# Patient Record
Sex: MALE | Race: ASIAN | NOT HISPANIC OR LATINO | Employment: OTHER | URBAN - METROPOLITAN AREA
[De-identification: names, ages, dates, MRNs, and addresses within clinical notes are randomized per-mention and may not be internally consistent; named-entity substitution may affect disease eponyms.]

---

## 2018-09-04 ENCOUNTER — HOSPITAL ENCOUNTER (EMERGENCY)
Facility: MEDICAL CENTER | Age: 23
End: 2018-09-06
Attending: EMERGENCY MEDICINE
Payer: MEDICAID

## 2018-09-04 DIAGNOSIS — R45.850 HOMICIDAL IDEATION: ICD-10-CM

## 2018-09-04 LAB — POC BREATHALIZER: 0 PERCENT (ref 0–0.01)

## 2018-09-04 PROCEDURE — 302970 POC BREATHALIZER: Performed by: EMERGENCY MEDICINE

## 2018-09-04 PROCEDURE — 99285 EMERGENCY DEPT VISIT HI MDM: CPT

## 2018-09-04 ASSESSMENT — LIFESTYLE VARIABLES: DO YOU DRINK ALCOHOL: NO

## 2018-09-05 LAB
AMPHET UR QL SCN: NEGATIVE
BARBITURATES UR QL SCN: NEGATIVE
BENZODIAZ UR QL SCN: NEGATIVE
BZE UR QL SCN: NEGATIVE
CANNABINOIDS UR QL SCN: NEGATIVE
METHADONE UR QL SCN: NEGATIVE
OPIATES UR QL SCN: NEGATIVE
OXYCODONE UR QL SCN: NEGATIVE
PCP UR QL SCN: NEGATIVE
PROPOXYPH UR QL SCN: NEGATIVE

## 2018-09-05 PROCEDURE — A9270 NON-COVERED ITEM OR SERVICE: HCPCS | Performed by: PSYCHIATRY & NEUROLOGY

## 2018-09-05 PROCEDURE — A9270 NON-COVERED ITEM OR SERVICE: HCPCS | Performed by: EMERGENCY MEDICINE

## 2018-09-05 PROCEDURE — 80307 DRUG TEST PRSMV CHEM ANLYZR: CPT

## 2018-09-05 PROCEDURE — 700102 HCHG RX REV CODE 250 W/ 637 OVERRIDE(OP): Performed by: PSYCHIATRY & NEUROLOGY

## 2018-09-05 PROCEDURE — 99244 OFF/OP CNSLTJ NEW/EST MOD 40: CPT | Performed by: PSYCHIATRY & NEUROLOGY

## 2018-09-05 PROCEDURE — 700102 HCHG RX REV CODE 250 W/ 637 OVERRIDE(OP): Performed by: EMERGENCY MEDICINE

## 2018-09-05 RX ORDER — MIRTAZAPINE 15 MG/1
15 TABLET, FILM COATED ORAL NIGHTLY PRN
Status: DISCONTINUED | OUTPATIENT
Start: 2018-09-05 | End: 2018-09-06 | Stop reason: HOSPADM

## 2018-09-05 RX ORDER — LORAZEPAM 1 MG/1
1 TABLET ORAL ONCE
Status: COMPLETED | OUTPATIENT
Start: 2018-09-05 | End: 2018-09-05

## 2018-09-05 RX ORDER — LURASIDONE HYDROCHLORIDE 40 MG/1
40 TABLET, FILM COATED ORAL
Status: DISCONTINUED | OUTPATIENT
Start: 2018-09-05 | End: 2018-09-06

## 2018-09-05 RX ADMIN — LURASIDONE HYDROCHLORIDE 40 MG: 40 TABLET, FILM COATED ORAL at 19:03

## 2018-09-05 RX ADMIN — LORAZEPAM 1 MG: 1 TABLET ORAL at 11:48

## 2018-09-05 ASSESSMENT — PAIN SCALES - GENERAL: PAINLEVEL_OUTOF10: 0

## 2018-09-05 NOTE — DISCHARGE PLANNING
Medical Social Work    Referral: Legal Hold    Intervention: Legal Hold Paperwork given to SW by [facility] [employer] RN Kaitlyn    Legal Hold Initiated: Date: 9/4/18 Time: 1645    Patient’s Insurance Listed on Face Sheet: None    Referrals sent to: [facility]    This referral contains the following information:  1) Face sheet _x___  2) Page 1 and Page 2 of Legal Hold _x___  3) Alert Team Assessment/Psych Assessment __done by [facility] OP_  4) Head to toe physical exam __x__  5) Urine Drug Screen __x__  6) Blood Alcohol __x__  7) Vital signs __x__  8) Pregnancy test when applicable _n/a__  9) Medications list __x__    Plan: Patient will transfer to mental health facility once acceptance is obtained  .  MSW called [facility] and alerted Rafy that pt is one of theirs and was sent over for medical clearance. Rafy to look into pt.

## 2018-09-05 NOTE — ED PROVIDER NOTES
ED Provider Note Addendum    Scribed for Tariq Burks M.D. by Patti Jha on 9/5/2018 at 10:44 AM.     This is an addendum to the note on Olvin Wtat.  For further details and full chart information, see patient's initial note.       6:00 AM - I discussed the patient's case with the night shift ERP who will transfer care of the patient to me at this time.        10:41 AM - Patient evaluated by myself. Patient is pacing around the room.  He reports feeling better than he did yesterday and is tolerating PO and fluid intake well. Denies abdominal pain or other complaints at this time. On exam, patient is agitated and disheveled. Will give 1 mg of Ativan to calm down.     Disposition:  Patient will be transferred to an appropriate psychiatric facility in stable condition for further psychiatric care and evaluation.  Patient will be placed under the care of my partner awaiting transfer.    FINAL IMPRESSION  1. Homicidal ideation         IPatti (Monseibe), am scribing for, and in the presence of, Tariq Burks M.D..    Electronically signed by: Patti Jha (Scribe), 9/5/2018    ITariq M.D. personally performed the services described in this documentation, as scribed by Patti Jha in my presence, and it is both accurate and complete.    The note accurately reflects work and decisions made by me.  Tariq Burks  9/5/2018  2:08 PM

## 2018-09-05 NOTE — PSYCHIATRY
"PSYCHIATRIC CONSULTATION:  Reason for admission:   Si/hi, med clearance from [facility]   Reason for consult: si/hi   Requesting Physician:stan    Legal status:  On hold     Chief Complaint: \"I was upset\"    HPI:    Pt is a 23 y/o man with a history of schizophrenia sent from [facility] for medical clearance; he reported he wanted to tear his sister's face off and kill him mom, also stated he would rather kill himself so he wouldn't hurt them. He has been paranoid and labile. Per ED staff, having sudden flashes of anger. He has been on psych meds in the past but hasn't taken them x 6 months.   He states he is still hearing voices currently. They are \"fragments of his mind\" telling him to watch out for people. He denies si/hi. He denies desire to hurt his family, denies command hallucinations today. He appeared internally stimulated during the interview. He seemed very guarded, quiet, difficult to talk to. He had a muffled voice.   He has been on seroquel, zyprexa, risperdal, depakote, nothing has worked for him in the past. He has had akathisia in the past with abilify. They tend to help a little at first and then stop working.   He is very flat when talking to me. Very internally stimualted. Sometimes makes no eye contact, sometimes has very intense eye contact. He does continue to appear very stimulated. He continually attends to noises that are happening outside the room.     Psychiatric Review of Systems:current symptoms as reported by pt.  Depression:      Denies depressed mood, has had suicidal ideation, low energy, no changes in appetite   [place]:No signs or symptoms   Anxiety/Panic Attacks  No signs or symptoms   PTSD symptom: unable to assess  Psychosis: +ah, very guarded, internally stimulated. States he \"sees people\", unclear if he means vh or not. Has had delusions yesterday, but isn't expressing now.   Other:       Medical Review of Systems: as reported by pt. All systems reviewed. Only those found to be + " "are noted below. All others are negative.   Neurological:    TBIs:denies    SZs:denies    Strokes:denies       Psychiatric Examination: observed phenomenon:  Vitals:   Vitals:    09/04/18 1735 09/05/18 0050 09/05/18 0608 09/05/18 1100   BP: 124/66 100/56 (!) 90/49 120/79   Pulse: 64 72 60 74   Resp: 16 16 16 12   Temp: 37 °C (98.6 °F) 36 °C (96.8 °F)  36.1 °C (96.9 °F)   SpO2: 94% 96% 94% 95%   Weight: 80.7 kg (178 lb)      Height: 1.753 m (5' 9\")          Musculoskeletal:psychomotor retardation. Poor eye contact alternating with very intense eye contact   Appearance: hair very messy.   Thoughts: +ah , paranoid   Speech: sparse , mumbling   Mood:          Depressed   Affect:         Blunted   SI/HI:   Denies today, but fear he is minimizing symtpoms   Attention/Alertness:   Poor attention   Memory:    Grossly intact.   Fund of Knowledge: unable to assess      Insight/Judgement into symptoms:  Poor   Neurological Testing:      Past Psychiatric Hx:   Has been inpatient many times per his report.   Has failed seroquel, zyprexa, abilify, risperdal.   He is willing to try another antipsychotic.     Family Psychiatric Hx:  Grandfather with schizophrenia  Other family with bipolar disorder     Social Hx:  Lives with his family including mother, brother, sister, and a friend of clint.   He states \"we all tolerate each other\"   He doesn't work, tries to explain a job he had but it doesn't quite make sense.   Social History     Social History   • Marital status: Single     Spouse name: N/A   • Number of children: N/A   • Years of education: N/A     Occupational History   • Not on file.     Social History Main Topics   • Smoking status: Not on file   • Smokeless tobacco: Not on file   • Alcohol use Not on file   • Drug use: Unknown   • Sexual activity: Not on file     Other Topics Concern   • Not on file     Social History Narrative   • No narrative on file       Drug/Alcohol/Tobacco Hx:   Drugs: meth use in the past, hasn't " "used in \"quite sometime\", use hallucinogens last month, nothing recently    Alcohol: denies    Tobacco:denies     Medical Hx: labs, MARS, medications, etc were reviewed. Only those findings of potential interest to psychiatry are noted below:  No past medical history on file.  Pt denies any medical history       Allergies: Patient has no known allergies.    Medications:  No current facility-administered medications for this encounter.      No current outpatient prescriptions on file.       Labs:  Recent Results (from the past 48 hour(s))   POC BREATHALIZER    Collection Time: 09/04/18  6:22 PM   Result Value Ref Range    POC Breathalizer 0.00 0.00 - 0.01 Percent   URINE DRUG SCREEN    Collection Time: 09/05/18 12:46 AM   Result Value Ref Range    Amphetamines Urine Negative Negative    Barbiturates Negative Negative    Benzodiazepines Negative Negative    Cocaine Metabolite Negative Negative    Methadone Negative Negative    Opiates Negative Negative    Oxycodone Negative Negative    Phencyclidine -Pcp Negative Negative    Propoxyphene Negative Negative    Cannabinoid Metab Negative Negative     No orders to display       ASSESSMENT:     Schizophrenia  R/o mood disorder     PLAN:  Legal status: extended  Starting latuda 40mg po daily   Declines antidepressant at this time.   States mood is better when psychosis is gone.      eligible for transfer to [facility] 6: NO  Will follow  Thank you for the consult.      "

## 2018-09-05 NOTE — ED NOTES
Patient's home medications have been reviewed by the pharmacy team.     No past medical history on file.    Patient's Medications    No medications on file          A:  Medications do not appear to be contributing to current complaints as pt reports no home medications.    P:    No recommendations at this time.    Caio Pineda

## 2018-09-05 NOTE — ED NOTES
Patient has already been screened at [facility], just needs medical clearance per [facility] [employer].

## 2018-09-05 NOTE — ED TRIAGE NOTES
"Patient sent from [facility] for medical clearance after reporting that he wants to tear his sister's face off and kill his mom, and would also rather kill himself \"so there would be no risk to them;\" hearing voices, paranoid, labile mood with sudden flashes of anger, hx meth abuse  "

## 2018-09-05 NOTE — ED NOTES
Belongings searched by security, money taken by PAR for safekeeping and counted with security. Remaining belongings placed in locker #13.

## 2018-09-05 NOTE — ED NOTES
Patient given turkey sandwich and water. Asked patient to inform RN when he is able to provide a urine specimen, patient agrees

## 2018-09-05 NOTE — ED NOTES
Pt awake to verbal, he has no complaints this am , pt continues to rest he is calm and cooperative with plan of care , security is outside room for 1:1 observation

## 2018-09-05 NOTE — ED PROVIDER NOTES
"ED Provider Note    Scribed for Dr. Muriel Alejo M.D. by Mikel Velasco. 9/4/2018, 6:41 PM.    CHIEF COMPLAINT  Chief Complaint   Patient presents with   • Homicidal Ideation   • Suicidal Ideation       HPI  Olvin Watt is a 22 y.o. male who presents to the Emergency Department for evaluation of suicidal and homicidal ideation onset six months ago. Olvin admits to self harm in the past. He states he has been non-compliant with his medications for the last six months, because he moved and did not establish care with a new doctor. Olvin denies any drug use at this time.    Per nurses note the patient states he wanted to harm his sister and mother and would rather kill himself. He is reported to be paranoid, hearing noises, and have a labile mood with anger flashes.    REVIEW OF SYSTEMS  Pertinent positives include Suicidal ideation, homicidal ideation. Pertinent negatives include no drug use. E.    PAST MEDICAL HISTORY  History of bipolar and schizophrenia disorders    SOCIAL HISTORY  Social History   Substance Use Topics   • Smoking status: None noted   • Smokeless tobacco: None noted   • Alcohol use None noted      History   Drug use: Unknown       SURGICAL HISTORY  patient denies any surgical history    FAMILY HISTORY  None noted    CURRENT MEDICATIONS  No current facility-administered medications on file prior to encounter.      No current outpatient prescriptions on file prior to encounter.       ALLERGIES  No Known Allergies    PHYSICAL EXAM  VITAL SIGNS: /66   Pulse 64   Temp 37 °C (98.6 °F)   Resp 16   Ht 1.753 m (5' 9\")   Wt 80.7 kg (178 lb)   SpO2 94%   BMI 26.29 kg/m²     Constitutional: Alert in no apparent distress.  HENT: Normocephalic, Atraumatic, Bilateral external ears normal. Nose normal.   Eyes:  Conjunctiva normal, non-icteric.   Lungs: Non-labored respirations  Skin: Warm, Dry, No erythema, No rash.   Neurologic: Alert, Grossly non-focal.   Psychiatric: Withdraw, Flat " affect      LABS  Labs Reviewed   POC BREATHALIZER - Normal   URINE DRUG SCREEN       COURSE & MEDICAL DECISION MAKING  Pertinent Labs & Imaging studies reviewed. (See chart for details)    6:41 PM - Patient seen and examined at bedside. Ordered Urine Drug Screen to evaluate his symptoms. Informed him [facility] will be in with him shortly for further evaluation.  Patient has already been seen by another [place] [facility] mental [facility] and placed on a hold.  He is medically clear will be transferred when a bed is available.    DISPOSITION:   Patient was medically cleared and will be transferred to a psychiatric facility when a bed is available      FINAL IMPRESSION  1. Homicidal ideation         This dictation has been created using voice recognition software and/or scribes. The accuracy of the dictation is limited by the abilities of the software and the expertise of the scribes. I expect there may be some errors of grammar and possibly content. I made every attempt to manually correct the errors within my dictation. However, errors related to voice recognition software and/or scribes may still exist and should be interpreted within the appropriate context.    The note accurately reflects work and decisions made by me.  Muriel Alejo  9/5/2018  1:47 AM

## 2018-09-05 NOTE — ED NOTES
Pt resting in bed, officer is at the bedside for 1:1 observation,  Pt reoriented to place and situation, pt requesting

## 2018-09-06 VITALS
SYSTOLIC BLOOD PRESSURE: 118 MMHG | HEART RATE: 77 BPM | BODY MASS INDEX: 26.36 KG/M2 | WEIGHT: 178 LBS | OXYGEN SATURATION: 98 % | RESPIRATION RATE: 16 BRPM | DIASTOLIC BLOOD PRESSURE: 74 MMHG | HEIGHT: 69 IN | TEMPERATURE: 98.4 F

## 2018-09-06 PROCEDURE — 99283 EMERGENCY DEPT VISIT LOW MDM: CPT | Performed by: PSYCHIATRY & NEUROLOGY

## 2018-09-06 RX ORDER — LURASIDONE HYDROCHLORIDE 80 MG/1
80 TABLET, FILM COATED ORAL
Status: DISCONTINUED | OUTPATIENT
Start: 2018-09-06 | End: 2018-09-06 | Stop reason: HOSPADM

## 2018-09-06 NOTE — PSYCHIATRY
"PSYCHIATRIC FOLLOW UP:    Reason for Admission: SI, HI, medical clearance from [facility], psychosis  Psychiatric Supervising Attending: Araceli Christian MD        Subjective: No acute events overnight. Security continues to watch pt. The pt has been following staff instructions and adhering to the medication schedule. The pt was reserved and a poor historian during the interview. He denies difficulties with taking Latuda. Endorses feeling \"anxious\". Continues to be quite paranoid, standing at his room window staring out into the ruby. Asked why there was not \"a symbol of time\" in his room. Also asked questions about dish soap and washing his body with it. Denies adverse reactions to Latuda. Discussed increasing dose. Denied additional questions or concerns.       Psychiatric Examination (MSE) :    Vitals: /74   Pulse 77   Temp 36.9 °C (98.4 °F)   Resp 16   Ht 1.753 m (5' 9\")   Wt 80.7 kg (178 lb)   SpO2 98%   BMI 26.29 kg/m²   Musculoskeletal:psychomotor retardation. Poor eye contact alternating with very intense eye contact   Appearance: hair very messy.   Thoughts: +AH., +paranoia  Speech: sparse , mumbling   Mood:          Depressed   Affect:         Blunted   SI/HI: denies SI, +HI - saying \"everyone has those thoughts.\"   Attention/Alertness:   Poor attention   Memory:    Grossly intact.   Fund of Knowledge: unable to assess      Insight/Judgement into symptoms:  Poor     Assessment:  1. Schizophrenia  2. R/o specific mood disorder    Plan:   INCREASE Latuda to 80 mg po daily  Recommend transfer to [facility] - being transferred today  Legal status: extended  /Signing off - pt transferring to [facility]  Thank you for the consult.       "

## 2018-09-06 NOTE — ED NOTES
Report received from Mason CLINE.  Pt resting on cot. Eyes closed.  Pt breathing regular and not labored.  Security outside room.

## 2018-09-06 NOTE — ED NOTES
"Discharging patient with [facility]MA to [facility]. All questions answered. Vitals signs WNL. Pt given discharge information. Pt did want to wait to get items out of safe keeping. \"I want to leave now\". Pt advised to return to ER upon discharge from [facility].  Discharge assessment completed.   "

## 2018-09-06 NOTE — ED NOTES
Pt requesting something to eat . Pt given boxed lunch and water.  Pt has no other needs at this time.  Security outside room.

## 2018-09-06 NOTE — DISCHARGE PLANNING
.Medical Social Work    Referral: Legal hold Transfer to Mental Health Facility    Intervention: JASON received call from Letitia at [facility] stating that they have accepted the patient for admission.     Pt's accepting physcian is Dr. Saul GOMEZ arranged for transportation to be set up through [facility]    The pt will be picked up at 1100.     JASON notified the RN of the departure time as well as accepting facility.     JASON created transfer packet and placed on chart.     Plan: Pt will transfer back to [facility] at 1100.

## 2018-09-06 NOTE — DISCHARGE PLANNING
Alert team note:  Awake and alert.  Denies S/i or H/I.  States the voices in his head have lessened and they are not saying harmful things.  States it is helpful to get off the streets, get hydrated, and get some sleep, as it helps the voices.  Continue to wait for psychiatric placement.

## 2019-01-04 ENCOUNTER — HOSPITAL ENCOUNTER (EMERGENCY)
Facility: MEDICAL CENTER | Age: 24
End: 2019-01-07
Attending: EMERGENCY MEDICINE
Payer: MEDICARE

## 2019-01-04 DIAGNOSIS — R45.850 HOMICIDAL IDEATION: ICD-10-CM

## 2019-01-04 DIAGNOSIS — Z00.8 MEDICAL CLEARANCE FOR PSYCHIATRIC ADMISSION: ICD-10-CM

## 2019-01-04 LAB
AMPHET UR QL SCN: NEGATIVE
BARBITURATES UR QL SCN: NEGATIVE
BENZODIAZ UR QL SCN: NEGATIVE
BZE UR QL SCN: NEGATIVE
CANNABINOIDS UR QL SCN: NEGATIVE
METHADONE UR QL SCN: NEGATIVE
OPIATES UR QL SCN: NEGATIVE
OXYCODONE UR QL SCN: NEGATIVE
PCP UR QL SCN: NEGATIVE
POC BREATHALIZER: 0 PERCENT (ref 0–0.01)
PROPOXYPH UR QL SCN: NORMAL

## 2019-01-04 PROCEDURE — 302970 POC BREATHALIZER: Performed by: EMERGENCY MEDICINE

## 2019-01-04 PROCEDURE — A9270 NON-COVERED ITEM OR SERVICE: HCPCS | Performed by: EMERGENCY MEDICINE

## 2019-01-04 PROCEDURE — 99285 EMERGENCY DEPT VISIT HI MDM: CPT

## 2019-01-04 PROCEDURE — 80307 DRUG TEST PRSMV CHEM ANLYZR: CPT

## 2019-01-04 PROCEDURE — 700102 HCHG RX REV CODE 250 W/ 637 OVERRIDE(OP): Performed by: EMERGENCY MEDICINE

## 2019-01-04 RX ORDER — SERTRALINE HYDROCHLORIDE 100 MG/1
100 TABLET, FILM COATED ORAL DAILY
Status: DISCONTINUED | OUTPATIENT
Start: 2019-01-05 | End: 2019-01-07 | Stop reason: HOSPADM

## 2019-01-04 RX ORDER — SERTRALINE HYDROCHLORIDE 100 MG/1
200 TABLET, FILM COATED ORAL EVERY MORNING
COMMUNITY

## 2019-01-04 RX ORDER — TRAZODONE HYDROCHLORIDE 50 MG/1
300 TABLET ORAL NIGHTLY
Status: DISCONTINUED | OUTPATIENT
Start: 2019-01-04 | End: 2019-01-07 | Stop reason: HOSPADM

## 2019-01-04 RX ORDER — TRAZODONE HYDROCHLORIDE 300 MG/1
300 TABLET ORAL NIGHTLY
COMMUNITY
End: 2019-01-07

## 2019-01-04 RX ORDER — LURASIDONE HYDROCHLORIDE 80 MG/1
80 TABLET, FILM COATED ORAL
Status: DISCONTINUED | OUTPATIENT
Start: 2019-01-04 | End: 2019-01-07 | Stop reason: HOSPADM

## 2019-01-04 RX ORDER — LURASIDONE HYDROCHLORIDE 80 MG/1
80 TABLET, FILM COATED ORAL
COMMUNITY
End: 2019-10-11

## 2019-01-04 RX ADMIN — LURASIDONE HYDROCHLORIDE 80 MG: 80 TABLET, FILM COATED ORAL at 19:09

## 2019-01-04 RX ADMIN — TRAZODONE HYDROCHLORIDE 300 MG: 50 TABLET ORAL at 21:36

## 2019-01-04 ASSESSMENT — PAIN SCALES - GENERAL
PAINLEVEL_OUTOF10: 0
PAINLEVEL_OUTOF10: 0

## 2019-01-04 ASSESSMENT — LIFESTYLE VARIABLES: DO YOU DRINK ALCOHOL: NO

## 2019-01-04 NOTE — ED NOTES
Pt belongings placed in locker 13 after being checked by security. Security obtained cigarettes, lighters and other smoking items which they will keep until discharge.

## 2019-01-04 NOTE — DISCHARGE PLANNING
MSW received legal hold form Alert team Carey. Pt is from [facility] outpatient clinic. Inpatient should be expecting pt once medically cleared. MSW spoke to Frandy at [facility] intake and they are aware of pt. Will send medical clearance over once completed.

## 2019-01-04 NOTE — ED TRIAGE NOTES
"Pt brought in by ambulance from [facility] for medical clearance. Pt was brought there this morning and placed on a legal hold for homicidal ideation. Pt having thoughts of harming his family. Pt states to this RN, \"its not a plan, its not a thought, it's something Im going to do\". Pt has history of HI toward family. EMS reports pt has been non-compliant with medications. Pt cooperative on arrival.   "

## 2019-01-04 NOTE — ED NOTES
Security immediately called to bedside on arrival due to homicidal ideation. Security at bedside.

## 2019-01-04 NOTE — ED NOTES
Pt continues to rest with no discomfort or distress. Security monitoring with 1:1 supervision. Continue to monitor.

## 2019-01-04 NOTE — ED NOTES
This RN contacted social work regarding plan. Awaiting acceptance from [facility] for transfer back.

## 2019-01-04 NOTE — ED NOTES
Med Rec completed per paper in chart from facility he came from   Allergies reviewed  No ORAL antibiotics in last 30 days

## 2019-01-04 NOTE — ED PROVIDER NOTES
"ED Provider Note    Scribed for Tariq Burks M.D. by Jonah Matthew. 1/4/2019  10:00 AM    Primary care provider: No primary care provider on file.  Means of arrival: EMS  History obtained from: patient, EMS  History limited by: none    CHIEF COMPLAINT  Chief Complaint   Patient presents with   • Medical Clearance   • Homicidal Ideation       CHEO Watt is a 23 y.o. male who presents to the Emergency Department for medical clearane and evaluation of homicidal ideation starting this morning. Patient presents from [facility] for medical clearance after arriving at the facility for evaluation of homicidal ideation. He reports that he is having ideation of harming his family, but does not have a plan to complete this act. Patient states that he has not been compliant with is prescribed medications because they \":haven't been working.\" Patient denies suicidal ideation, fever, chest pain, shortness of breat, abdominal pain, nausea, vomiting, diarrhea, alcohol use.     REVIEW OF SYSTEMS  Pertinent positives include homicidal ideation, medication non compliance. Pertinent negatives include no suicidal ideation, fever, chest pain, shortness of breat, abdominal pain, nausea, vomiting, diarrhea.  All other systems reviewed and negative.    PAST MEDICAL HISTORY   has a past medical history of Psychiatric disorder.    SURGICAL HISTORY  patient denies any surgical history    SOCIAL HISTORY  Social History   Substance Use Topics   • Smoking status: No   • Smokeless tobacco: No   • Alcohol use No      History   Drug use: Yes       FAMILY HISTORY  None noted    CURRENT MEDICATIONS  Home Medications     Reviewed by Akila Gustafson (Pharmacy Tech) on 01/04/19 at 1331  Med List Status: Complete   Medication Last Dose Status   lurasidone (LATUDA) 80 MG Tab 1/3/2019 Active   sertraline (ZOLOFT) 100 MG Tab 1/4/2019 Active   trazodone (DESYREL) 300 MG tablet 1/3/2019 " "Active                ALLERGIES  No Known Allergies    PHYSICAL EXAM  VITAL SIGNS: /85   Pulse 84   Temp 36.9 °C (98.4 °F) (Tympanic)   Resp 20   Ht 1.753 m (5' 9\")   Wt 83.9 kg (185 lb)   BMI 27.32 kg/m²     Constitutional: Well developed, Well nourished, no distress, Non-toxic appearance.   HENT: Normocephalic, Atraumatic, Bilateral external ears normal, Oropharynx moist, No oral exudates.   Eyes: PERRLA, EOMI, Conjunctiva normal, No discharge.   Neck: No tenderness, Supple, No stridor.   Lymphatic: No lymphadenopathy noted.   Cardiovascular: Normal heart rate, Normal rhythm.   Thorax & Lungs: Clear to auscultation bilaterally, No respiratory distress, No wheezing, No crackles.   Abdomen: Soft, No tenderness, No masses, No pulsatile masses.   Skin: Warm, Dry, No erythema, No rash.   Extremities:, No edema No cyanosis.   Musculoskeletal: No tenderness to palpation or major deformities noted.  Intact distal pulses  Neurologic: Awake, alert. Moves all extremities spontaneously.  Psychiatric: Very withdrawn, poor eye contact, seems agitated.     LABS  Labs Reviewed   POC BREATHALIZER - Normal   URINE DRUG SCREEN   All labs reviewed by me.    RADIOLOGY  No orders to display   The radiologist's interpretation of all radiological studies have been reviewed by me.    COURSE & MEDICAL DECISION MAKING  Pertinent Labs & Imaging studies reviewed. (See chart for details)    I reviewed the patient's medical records which showed the patient as last seen in the ED for SI, HI.     10:00 AM - Patient seen and examined at bedside. Ordered POC breathalyzer, Urine drug screen to evaluate his symptoms. The differential diagnoses include but are not limited to: homicidal ideation, medication non compliance    11:40 AM - I discussed the patient's case and the above findings with Alert team who agrees to evaluate the patient.,  They have seen the assessment and hold have been placed by outpatient psychiatrist        Decision " Making:  Patient sent here by [facility] for medical clearance.  The patient is having homicidal ideations, has not been taking his medicines.  The patient has no medical complaints, have medically cleared the patient, the patient will be transferred to a psychiatric facility.    Disposition:  Patient will be transferred to an appropriate psychiatric facility in stable condition for further psychiatric care and evaluation.  Patient will be placed under the care of my partner awaiting transfer.    FINAL IMPRESSION  1. Homicidal ideation    2. Medical clearance for psychiatric admission          IJonah ([place]), am scribing for, and in the presence of, Tariq Burks M.D..    Electronically signed by: Jonah Matthew ([place]), 1/4/2019    ITariq M.D. personally performed the services described in this documentation, as scribed by Jonah Matthew in my presence, and it is both accurate and complete.    The note accurately reflects work and decisions made by me.  Tariq Burks  1/4/2019  2:07 PM

## 2019-01-05 PROCEDURE — 700102 HCHG RX REV CODE 250 W/ 637 OVERRIDE(OP): Performed by: EMERGENCY MEDICINE

## 2019-01-05 PROCEDURE — A9270 NON-COVERED ITEM OR SERVICE: HCPCS | Performed by: EMERGENCY MEDICINE

## 2019-01-05 RX ADMIN — LURASIDONE HYDROCHLORIDE 80 MG: 80 TABLET, FILM COATED ORAL at 20:08

## 2019-01-05 RX ADMIN — TRAZODONE HYDROCHLORIDE 300 MG: 50 TABLET ORAL at 20:37

## 2019-01-05 RX ADMIN — SERTRALINE 100 MG: 100 TABLET, FILM COATED ORAL at 06:00

## 2019-01-05 ASSESSMENT — PAIN SCALES - GENERAL: PAINLEVEL_OUTOF10: 0

## 2019-01-05 NOTE — ED NOTES
Break RN: patient resting w/ visible, unlabored respirations observed; security in direct view of pt.

## 2019-01-05 NOTE — ED NOTES
Patient sleeping on left side, RR even and unlabored. Remains in view of security. All safety maintained, will CTM.

## 2019-01-05 NOTE — ED NOTES
Pt lying in bed with even, unlabored respirations noted.  No needs at this time. Will continue to monitor.

## 2019-01-05 NOTE — DISCHARGE PLANNING
MSW called and spoke with Sydni at [facility]. They are full at this time. Likely will not have a bed this weekend.

## 2019-01-05 NOTE — ED NOTES
Patient's home medications have been reviewed by the pharmacy team.     Past Medical History:   Diagnosis Date   • Psychiatric disorder        Patient's Medications   New Prescriptions    No medications on file   Previous Medications    LURASIDONE (LATUDA) 80 MG TAB    Take 80 mg by mouth with dinner.    SERTRALINE (ZOLOFT) 100 MG TAB    Take 100 mg by mouth every day.    TRAZODONE (DESYREL) 300 MG TABLET    Take 300 mg by mouth every evening.   Modified Medications    No medications on file   Discontinued Medications    No medications on file       C/o HI     A:  Medications do not appear to be contributing to current complaints.       P:    No recommendations at this time. Home medications have been reordered as appropriate.      Kami Clay, PharmD., BCPS

## 2019-01-05 NOTE — DISCHARGE PLANNING
Alert Team  Spoke with JASON Rashid this am to confirm pt does NOT need AT assessment.  Per JASON, pt to be direct admit to [facility] as soon as first bed available.  Pt was assessed at their outpt clinic just prior to arrival and was sent purely for medical clearance.  He is one of their existing patients.  Pt waiting on bed at [facility].  AT remains available for any pt needs while in the ER.

## 2019-01-05 NOTE — ED NOTES
Pt lying in bed with even, unlabored respirations noted. Pt remains in 1:1 direct observation of security. No needs at this time. Will continue to monitor.

## 2019-01-05 NOTE — ED NOTES
Pt lying in bed with even, unlabored respirations noted. Pt remains in 1:1 direct observation with security. No needs at this time. Will continue to monitor.

## 2019-01-05 NOTE — ED NOTES
Pt sleeping, easily wakes for vitals. Medicated per MAR. Remains calm and cooperative. Denies needs at this time. Remains in view of security. All safety maintained.

## 2019-01-05 NOTE — ED NOTES
Checked in with patient, asking if he was hungry.  Patient given juice and a box meal.  Asked to go home and I told him no, when he asked why I advised him that he was a legal hold. He indicated understanding

## 2019-01-05 NOTE — ED NOTES
Patient remains sleeping, repositions self periodically. Remains in view of . All safety maintained, will CTM.

## 2019-01-05 NOTE — ED NOTES
Pt lying in bed with even, unlabored respirations noted. Pt remains in 1:1 direct observation by security. No needs at this time. Will continue to monitor.

## 2019-01-05 NOTE — ED PROVIDER NOTES
"ED Provider Note  Addendum: Patient was signed out to me by Dr. Christina.  The patient has no episodes throughout the evening.  Vital signs are stable, has no evidence of an organ damage.  He is waiting for transfer to a local psychiatric facility.  /65   Pulse 75   Temp 36.7 °C (98.1 °F) (Temporal)   Resp 16   Ht 1.753 m (5' 9\")   Wt 83.9 kg (185 lb)   SpO2 95%   BMI 27.32 kg/m²         "

## 2019-01-05 NOTE — ED NOTES
Patient sleeping, easily wakes for vitals. Medicated per MAR. Patient remains calm and cooperative. Remains in view of security. No needs at this time, will CTM.

## 2019-01-05 NOTE — ED NOTES
Pt awakes easily to verbal stimulation. Pt given breakfast tray. No further needs at this time. Security in direct observation of patient with unobstructed view.

## 2019-01-05 NOTE — ED NOTES
Patient remains sleeping, repositions self periodically. RR even and unlabored. Remains in view of security. All safety maintained, will ctm.

## 2019-01-06 PROCEDURE — 700102 HCHG RX REV CODE 250 W/ 637 OVERRIDE(OP): Performed by: EMERGENCY MEDICINE

## 2019-01-06 PROCEDURE — A9270 NON-COVERED ITEM OR SERVICE: HCPCS | Performed by: EMERGENCY MEDICINE

## 2019-01-06 RX ADMIN — SERTRALINE 100 MG: 100 TABLET, FILM COATED ORAL at 06:00

## 2019-01-06 RX ADMIN — TRAZODONE HYDROCHLORIDE 300 MG: 50 TABLET ORAL at 21:26

## 2019-01-06 RX ADMIN — LURASIDONE HYDROCHLORIDE 80 MG: 80 TABLET, FILM COATED ORAL at 18:28

## 2019-01-06 NOTE — ED NOTES
Pt given morning medicine, pt calm and cooperative, security at bedside, no complaints at this time

## 2019-01-06 NOTE — ED PROVIDER NOTES
Patient reevaluated.  Patient is on a legal hold, waiting transfer to psychiatric facility when a bed is available.  Please refer to initial note for complete details.  No concerns overnight.  Patient ambulates to the bathroom independently and tolerated a meal.  Medication reconciliation has been reviewed.

## 2019-01-06 NOTE — ED NOTES
Pt resting in bed.  Verbalizes understanding of plan of care.  Calm and cooperative.  Security in direct obs of pt.

## 2019-01-06 NOTE — ED NOTES
Pt provided with lunch. Calm and cooperative. Sitting in strecther eating. Sitter continues to monitor.

## 2019-01-06 NOTE — ED NOTES
Verbal report taken from Leslie RN; pt resting w/ respirations visible and unlabored; security in view of pt.

## 2019-01-06 NOTE — DISCHARGE PLANNING
Alert team  note;  [facility] evaluation completed today at 1700  by Li with recommendation to transfer pt to [facility] for inpt MH treatment

## 2019-01-07 VITALS
BODY MASS INDEX: 27.4 KG/M2 | SYSTOLIC BLOOD PRESSURE: 119 MMHG | TEMPERATURE: 97.7 F | HEART RATE: 83 BPM | DIASTOLIC BLOOD PRESSURE: 79 MMHG | OXYGEN SATURATION: 95 % | WEIGHT: 185 LBS | HEIGHT: 69 IN | RESPIRATION RATE: 16 BRPM

## 2019-01-07 PROCEDURE — A9270 NON-COVERED ITEM OR SERVICE: HCPCS | Performed by: EMERGENCY MEDICINE

## 2019-01-07 PROCEDURE — 700102 HCHG RX REV CODE 250 W/ 637 OVERRIDE(OP): Performed by: EMERGENCY MEDICINE

## 2019-01-07 PROCEDURE — 99284 EMERGENCY DEPT VISIT MOD MDM: CPT | Performed by: PSYCHIATRY & NEUROLOGY

## 2019-01-07 RX ORDER — MIRTAZAPINE 15 MG/1
7.5 TABLET, FILM COATED ORAL
Qty: 30 TAB | Refills: 0 | Status: SHIPPED | OUTPATIENT
Start: 2019-01-07 | End: 2019-10-11

## 2019-01-07 RX ADMIN — SERTRALINE 100 MG: 100 TABLET, FILM COATED ORAL at 06:33

## 2019-01-07 NOTE — ED NOTES
Legal hold discontinued by MD Christian.  Pt belongings returned to pt.  Pt provided with discharge instructions, prescriptions x1, instructions for follow up appointment, s/s of when to seek emergency care.  Pt verbalizes understanding.  Pt discharged in good condition.

## 2019-01-07 NOTE — ED NOTES
Hourly rounding performed. Assessed patient complaints and Bathroom/comfort needs.  Patient sleeping. Sitter at bedside

## 2019-01-09 ASSESSMENT — ENCOUNTER SYMPTOMS
EYES NEGATIVE: 1
CARDIOVASCULAR NEGATIVE: 1
GASTROINTESTINAL NEGATIVE: 1
MUSCULOSKELETAL NEGATIVE: 1
CONSTITUTIONAL NEGATIVE: 1
PSYCHIATRIC NEGATIVE: 1
RESPIRATORY NEGATIVE: 1
NEUROLOGICAL NEGATIVE: 1
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
## 2019-09-29 ENCOUNTER — HOSPITAL ENCOUNTER (EMERGENCY)
Facility: MEDICAL CENTER | Age: 24
End: 2019-09-29
Attending: EMERGENCY MEDICINE
Payer: MEDICARE

## 2019-09-29 VITALS
TEMPERATURE: 98.5 F | HEIGHT: 68 IN | DIASTOLIC BLOOD PRESSURE: 97 MMHG | BODY MASS INDEX: 30.1 KG/M2 | RESPIRATION RATE: 24 BRPM | OXYGEN SATURATION: 97 % | SYSTOLIC BLOOD PRESSURE: 130 MMHG | HEART RATE: 83 BPM | WEIGHT: 198.63 LBS

## 2019-09-29 DIAGNOSIS — R51.9 FACIAL PAIN: ICD-10-CM

## 2019-09-29 DIAGNOSIS — K04.7 PERIAPICAL ABSCESS: ICD-10-CM

## 2019-09-29 PROCEDURE — 700102 HCHG RX REV CODE 250 W/ 637 OVERRIDE(OP): Performed by: EMERGENCY MEDICINE

## 2019-09-29 PROCEDURE — A9270 NON-COVERED ITEM OR SERVICE: HCPCS | Performed by: EMERGENCY MEDICINE

## 2019-09-29 PROCEDURE — 99284 EMERGENCY DEPT VISIT MOD MDM: CPT

## 2019-09-29 RX ORDER — AMOXICILLIN 500 MG/1
500 TABLET, FILM COATED ORAL 3 TIMES DAILY
Qty: 21 TAB | Refills: 0 | Status: SHIPPED | OUTPATIENT
Start: 2019-09-29 | End: 2019-10-11

## 2019-09-29 RX ORDER — HYDROCODONE BITARTRATE AND ACETAMINOPHEN 5; 325 MG/1; MG/1
2 TABLET ORAL ONCE
Status: COMPLETED | OUTPATIENT
Start: 2019-09-29 | End: 2019-09-29

## 2019-09-29 RX ORDER — AMOXICILLIN 250 MG/1
500 CAPSULE ORAL ONCE
Status: COMPLETED | OUTPATIENT
Start: 2019-09-29 | End: 2019-09-29

## 2019-09-29 RX ORDER — IBUPROFEN 600 MG/1
600 TABLET ORAL ONCE
Status: COMPLETED | OUTPATIENT
Start: 2019-09-29 | End: 2019-09-29

## 2019-09-29 RX ORDER — HYDROCODONE BITARTRATE AND ACETAMINOPHEN 5; 325 MG/1; MG/1
1-2 TABLET ORAL EVERY 4 HOURS PRN
Qty: 20 TAB | Refills: 0 | Status: SHIPPED | OUTPATIENT
Start: 2019-09-29 | End: 2019-10-02

## 2019-09-29 RX ADMIN — HYDROCODONE BITARTRATE AND ACETAMINOPHEN 2 TABLET: 5; 325 TABLET ORAL at 18:23

## 2019-09-29 RX ADMIN — AMOXICILLIN 500 MG: 250 CAPSULE ORAL at 18:23

## 2019-09-29 RX ADMIN — IBUPROFEN 600 MG: 600 TABLET ORAL at 18:23

## 2019-09-30 NOTE — ED NOTES
Pt for d/c per request. Dental referral list provided as well erp provided referrals as well for pt f/u . Prescriptions also reviewed with pt and mom. Aware of no driving due to meds recvd in er as well as while taking narcotics

## 2019-09-30 NOTE — ED TRIAGE NOTES
Pt in w/ mother  C/o severe R side face pain that has been ongoing since yesterday  No Hx of such  Does have dental abscesses in the past however nothing this bad before per mother  Pt uncooperative and very difficulty to get information from him   writhing in pain  Rocking back and forth in chair crying and holding R side of face  Taken back to RM 12

## 2019-09-30 NOTE — ED NOTES
Continues with ice bag to rt side of face, states improvement in pain with same. Med per erp. Mom remains at bedside, denies further needs

## 2019-09-30 NOTE — ED PROVIDER NOTES
"ED Provider Note    CHIEF COMPLAINT  Chief Complaint   Patient presents with   • Pain     severe R side of facial pain that stated yesterday       HPI  Olvin Watt is a 23 y.o. male who presents with severe right-sided facial pain without swelling onset yesterday.  Pain is constant.  He can swallow.  No fever or headache.  He has had dental pain before but this is more severe.  Denies trauma.  No allergies..    REVIEW OF SYSTEMS  Pertinent positives include: Right facial pain.  Pertinent negatives include: Fever, shortness of breath, inability to swallow.    PAST MEDICAL HISTORY  Past Medical History:   Diagnosis Date   • Psychiatric disorder        CURRENT MEDICATIONS  Home Medications     Reviewed by Kadie Wei R.N. (Registered Nurse) on 09/29/19 at 1745  Med List Status: Partial   Medication Last Dose Status   lurasidone (LATUDA) 80 MG Tab 9/29/2019 Active   mirtazapine (REMERON) 15 MG Tab  Active   sertraline (ZOLOFT) 100 MG Tab  Active                ALLERGIES  No Known Allergies    PHYSICAL EXAM  VITAL SIGNS: BP (!) 214/82   Pulse 92   Temp 36.4 °C (97.5 °F) (Temporal)   Resp (!) 28   Ht 1.727 m (5' 8\")   Wt 90.1 kg (198 lb 10.2 oz)   SpO2 99%   BMI 30.20 kg/m²   Constitutional: Well developed, Well nourished, hypertensive, holding an ice pack to the right side of his face.  Occasionally flailing trembling and yelling with pain nurse first by periods of calmness  HENT: Atraumatic mild asymmetry on right suspicious for swelling but the mother denies that its present, no TMJ crepitus, no trismus, no intraoral erythema or edema.  He has her neuropathy right rear lower molar and all his right sided lower molars are tender.  There is no abscess pointing to the gingiva.  No severe caries..   Eyes: PERRLA,  no scleral icterus.   Neck: , Supple, no meningismus or nuchal rigidity.   Lymphatic: No lymphadenopathy noted.   Respiratory: No rales, rhonchi or wheeze or stridor.   Skin: No erythema, no rash. "     COURSE & MEDICAL DECISION MAKING    INTERVENTIONS:  Medications   ibuprofen (MOTRIN) tablet 600 mg (has no administration in time range)   HYDROcodone-acetaminophen (NORCO) 5-325 MG per tablet 2 Tab (has no administration in time range)   amoxicillin (AMOXIL) capsule 500 mg (has no administration in time range)      Improved pain to 6 of 10 but he still had a burning sensation to the pain.    This patient presents with facial pain that is likely dental.  He has no respiratory symptoms of sinusitis and pharyngitis are unlikely.  There is no ear pain.  There is no evidence of TMJ crepitus.  There is been no trauma.  There is no significant facial swelling.  There is no Marquise's angina or evidence of peritonsillar abscess.    PLAN:  Amoxicillin  Hydrocodone 5 mg  Prescription monitoring queried and score low  Opiate risk tool utilized and patient low risk  Informed consent obtained for opiate analgesic  Indication opiate analgesic dental pain  Dental pain handout  Follow up 1 to 4 days in the clinic of Dr. Lawrence or the clinic of dentiscarmencita Tolbert  Return for severe uncontrolled pain, shortness of breath, inability to swallow, significant facial swelling or ill appearance    Condition: Good    FINAL IMPRESSION  1. Facial pain    2. Periapical abscess          Electronically signed by: Jermaine Shahid, 9/29/2019 6:16 PM

## 2019-09-30 NOTE — ED NOTES
Pt appears to have discomfort to erp palpation at rt lower jaw. Per same, no definite area of inflammation

## 2019-09-30 NOTE — ED NOTES
Pt tearful, punched chair due to pain, minimally verbal at this time, info from mom. Warm blanket and ice bag provided. encouraged to relax, reman calm as staff is here to help

## 2019-09-30 NOTE — DISCHARGE INSTRUCTIONS
Dental Pain (Tooth Ache)    Start antibiotics today and take ibuprofen 600-800 mg every 6 hours or naproxen 500 mg every 8-12 hours for pain.    [facility] appearance, shortness of breath, difficulty swallowing or significant facial swelling.  Followup with your dentist, the on call oral surgeon Dr. Lawrence, the [facility] dentist, Dr. Jeff Tolbert ([phone], [address] [facility]), or Dr. Guillaume Hicks ([phone]) in 4 days.    You had an elevated or high normal blood pressure reading today.  This does not necessarily mean you have hypertension.  Please followup with your/a primary physician for comprehensive blood pressure evaluation.  BP Readings from Last 3 Encounters:   09/29/19 144/99   01/07/19 119/79   09/06/18 118/74       You have been seen by your caregiver because of a tooth ache. This may be caused by cavities (tooth decay) which expose the nerve of the tooth to air and hot or cold temperatures, which cause pain. It may come from an infection or abscess (also called a boil or furuncle) around your tooth, also often caused by dental caries (tooth decay). This causes the pain you are having.    DIAGNOSIS (HOW DO YOU TELL WHAT IS WRONG)  Your caregiver can diagnose this problem often by exam.    TREATMENT  If caused by an infection it may be treated with antibiotics (medications which kill germs) and pain medications as prescribed by your caregiver. Take medications as directed.  You may take ibuprofen (Advil® or Motrin®), acetaminophen (Tylenol®), or both, as needed for pain or temperature. Ibuprofen and acetaminophen may be taken together in their regular dosages.  Whether the tooth ache today is caused by infection or dental disease, it is advisable to see your dentist as soon as possible for further care.     CALL OR RETURN TO THIS LOCATION IF:  The exam and treatment you received today has been provided on an emergency basis only. This is not a substitute for complete medical or dental care.  If your problem worsens or new symptoms (problems) appear, and you are unable to arrange prompt follow-up care with your dentist, call or return to this location.      [employer]® Patient Information ©2007 [employer].

## 2019-10-10 ENCOUNTER — HOSPITAL ENCOUNTER (EMERGENCY)
Facility: MEDICAL CENTER | Age: 24
End: 2019-10-11
Attending: EMERGENCY MEDICINE
Payer: MEDICARE

## 2019-10-10 DIAGNOSIS — T50.902A INTENTIONAL DRUG OVERDOSE, INITIAL ENCOUNTER (HCC): ICD-10-CM

## 2019-10-10 DIAGNOSIS — R45.851 SUICIDAL IDEATION: ICD-10-CM

## 2019-10-10 LAB
ANION GAP SERPL CALC-SCNC: 11 MMOL/L (ref 0–11.9)
APAP SERPL-MCNC: <10 UG/ML (ref 10–30)
BASOPHILS # BLD AUTO: 0.7 % (ref 0–1.8)
BASOPHILS # BLD: 0.08 K/UL (ref 0–0.12)
BUN SERPL-MCNC: 14 MG/DL (ref 8–22)
CALCIUM SERPL-MCNC: 9.8 MG/DL (ref 8.5–10.5)
CHLORIDE SERPL-SCNC: 106 MMOL/L (ref 96–112)
CO2 SERPL-SCNC: 23 MMOL/L (ref 20–33)
CREAT SERPL-MCNC: 1.44 MG/DL (ref 0.5–1.4)
EOSINOPHIL # BLD AUTO: 0.14 K/UL (ref 0–0.51)
EOSINOPHIL NFR BLD: 1.3 % (ref 0–6.9)
ERYTHROCYTE [DISTWIDTH] IN BLOOD BY AUTOMATED COUNT: 43.6 FL (ref 35.9–50)
ETHANOL BLD-MCNC: 0 G/DL
GLUCOSE SERPL-MCNC: 128 MG/DL (ref 65–99)
HCT VFR BLD AUTO: 46.2 % (ref 42–52)
HGB BLD-MCNC: 15.3 G/DL (ref 14–18)
IMM GRANULOCYTES # BLD AUTO: 0.03 K/UL (ref 0–0.11)
IMM GRANULOCYTES NFR BLD AUTO: 0.3 % (ref 0–0.9)
LYMPHOCYTES # BLD AUTO: 2.48 K/UL (ref 1–4.8)
LYMPHOCYTES NFR BLD: 23.2 % (ref 22–41)
MCH RBC QN AUTO: 31 PG (ref 27–33)
MCHC RBC AUTO-ENTMCNC: 33.1 G/DL (ref 33.7–35.3)
MCV RBC AUTO: 93.5 FL (ref 81.4–97.8)
MONOCYTES # BLD AUTO: 0.8 K/UL (ref 0–0.85)
MONOCYTES NFR BLD AUTO: 7.5 % (ref 0–13.4)
NEUTROPHILS # BLD AUTO: 7.16 K/UL (ref 1.82–7.42)
NEUTROPHILS NFR BLD: 67 % (ref 44–72)
NRBC # BLD AUTO: 0 K/UL
NRBC BLD-RTO: 0 /100 WBC
PLATELET # BLD AUTO: 234 K/UL (ref 164–446)
PMV BLD AUTO: 10.7 FL (ref 9–12.9)
POTASSIUM SERPL-SCNC: 3.3 MMOL/L (ref 3.6–5.5)
RBC # BLD AUTO: 4.94 M/UL (ref 4.7–6.1)
SALICYLATES SERPL-MCNC: 0 MG/DL (ref 15–25)
SODIUM SERPL-SCNC: 140 MMOL/L (ref 135–145)
WBC # BLD AUTO: 10.7 K/UL (ref 4.8–10.8)

## 2019-10-10 PROCEDURE — 80048 BASIC METABOLIC PNL TOTAL CA: CPT

## 2019-10-10 PROCEDURE — 85025 COMPLETE CBC W/AUTO DIFF WBC: CPT

## 2019-10-10 PROCEDURE — 99285 EMERGENCY DEPT VISIT HI MDM: CPT

## 2019-10-10 PROCEDURE — 93005 ELECTROCARDIOGRAM TRACING: CPT | Performed by: EMERGENCY MEDICINE

## 2019-10-10 PROCEDURE — 304561 HCHG STAT O2

## 2019-10-10 PROCEDURE — 80307 DRUG TEST PRSMV CHEM ANLYZR: CPT

## 2019-10-11 VITALS
OXYGEN SATURATION: 95 % | BODY MASS INDEX: 26.66 KG/M2 | RESPIRATION RATE: 16 BRPM | TEMPERATURE: 97.2 F | HEART RATE: 85 BPM | SYSTOLIC BLOOD PRESSURE: 118 MMHG | DIASTOLIC BLOOD PRESSURE: 63 MMHG | WEIGHT: 180 LBS | HEIGHT: 69 IN

## 2019-10-11 LAB
AMPHET UR QL SCN: NEGATIVE
BARBITURATES UR QL SCN: NEGATIVE
BENZODIAZ UR QL SCN: POSITIVE
BZE UR QL SCN: NEGATIVE
CANNABINOIDS UR QL SCN: POSITIVE
EKG IMPRESSION: NORMAL
METHADONE UR QL SCN: NEGATIVE
OPIATES UR QL SCN: POSITIVE
OXYCODONE UR QL SCN: NEGATIVE
PCP UR QL SCN: NEGATIVE
PROPOXYPH UR QL SCN: NEGATIVE

## 2019-10-11 PROCEDURE — 700105 HCHG RX REV CODE 258: Performed by: EMERGENCY MEDICINE

## 2019-10-11 PROCEDURE — 80307 DRUG TEST PRSMV CHEM ANLYZR: CPT

## 2019-10-11 PROCEDURE — A9270 NON-COVERED ITEM OR SERVICE: HCPCS | Performed by: EMERGENCY MEDICINE

## 2019-10-11 PROCEDURE — 700102 HCHG RX REV CODE 250 W/ 637 OVERRIDE(OP): Performed by: EMERGENCY MEDICINE

## 2019-10-11 PROCEDURE — 90791 PSYCH DIAGNOSTIC EVALUATION: CPT

## 2019-10-11 RX ORDER — SODIUM CHLORIDE 9 MG/ML
1000 INJECTION, SOLUTION INTRAVENOUS ONCE
Status: COMPLETED | OUTPATIENT
Start: 2019-10-11 | End: 2019-10-11

## 2019-10-11 RX ORDER — ZOLPIDEM TARTRATE 10 MG/1
10 TABLET ORAL NIGHTLY PRN
COMMUNITY

## 2019-10-11 RX ORDER — AMOXICILLIN 500 MG/1
500 CAPSULE ORAL EVERY 8 HOURS
Status: DISCONTINUED | OUTPATIENT
Start: 2019-10-11 | End: 2019-10-11 | Stop reason: HOSPADM

## 2019-10-11 RX ORDER — AMOXICILLIN 500 MG/1
500 CAPSULE ORAL 3 TIMES DAILY
Qty: 21 CAP | Refills: 0 | Status: SHIPPED | OUTPATIENT
Start: 2019-10-11 | End: 2019-10-18

## 2019-10-11 RX ADMIN — SODIUM CHLORIDE 1000 ML: 9 INJECTION, SOLUTION INTRAVENOUS at 01:15

## 2019-10-11 RX ADMIN — AMOXICILLIN 500 MG: 500 CAPSULE ORAL at 12:48

## 2019-10-11 NOTE — ED NOTES
Pt belongings sent home with mother, Jen. Pt still sleeping at this time. Respirations equal and unlabored. Sitter 1:1.

## 2019-10-11 NOTE — ED NOTES
Pt requesting food, ERP allowed food after pt tolerating liquids. Water with straw provided to pt with RN assist. Pt not tolerating fluids, pt coughing and spitting up. Will attempt again later when more awake. Pt still very disoriented and mumbling unintelligibly

## 2019-10-11 NOTE — ED PROVIDER NOTES
ED Provider Note    Patient signed out from Dr. Aleman pending life skills evaluation.  Please see his note for the initial evaluation and management.  Lab results were fairly unremarkable and patient continued to be very somnolent in the ED.  Life skills attempted evaluation but due to patient's continued somnolence, they were unable to do so.  Patient did express to EMS that he took the Ambien in a suicidal gesture and therefore was placed on legal hold.  Patient will be signed out to the oncoming ED physician for continued monitoring and reevaluation by behavioral health once patient is more alert.

## 2019-10-11 NOTE — ED NOTES
Pt sleeping. Sitter 1:1 respirations equal and unlabored. Mother leaving at this time. Provided with ER phone number to call if she wants an update.

## 2019-10-11 NOTE — ED NOTES
Pt waking and mumbling unintelligibly removing monitors requiring frequent redirection and education with minimal evidence of learning

## 2019-10-11 NOTE — CONSULTS
"[facility]   TRIAGE ASSESSMENT    Name: Olvin Watt  MRN: [id number]  : 1995  Age: 23 y.o.  Date of assessment: 10/11/2019  PCP: Pcp Pt States None  Persons in attendance: Patient    CHIEF COMPLAINT/PRESENTING ISSUE (as stated by ): Has exhibited several conflicting statements: I didn't take 30 ambien I only took two with two pain pills.\"  Then he stated that he was mad that he wasted all those ambien and did not die. Another time he denies S/I. Drowsy, rambling slurred speech. States he just needs some pain pills for his pain.    Chief Complaint   Patient presents with   • Drug Overdose   • Suicide Attempt        CURRENT LIVING SITUATION/SOCIAL SUPPORT: states he lives with his grandmother.     BEHAVIORAL HEALTH TREATMENT HISTORY  Does patient/parent report a history of prior behavioral health treatment for patient?   Yes:    Dates Level of Care Facilty/Provider Diagnosis/Problem Medications   [id number] IP/OP [facility] Schizophrenia Latuda zoloft  Remeron                SAFETY ASSESSMENT - SELF  Does patient acknowledge current or past symptoms of dangerousness to self? Yes OD'd on Ambien  Does parent/significant other report patient has current or past symptoms of dangerousness to self? N\A  Does presenting problem suggest symptoms of dangerousness to self? Yes:     Past Current    Suicidal Thoughts: [x]  [x]    Suicidal Plans: [x]  [x]    Suicidal Intent: [x]  [x]    Suicide Attempts: [x]  [x]    Self-Injury [x]  [x]      For any boxes checked above, provide detail: states he would still like to die and is mad that he is still alive and would like to cut himself.     History of suicide by family member: no  History of suicide by friend/significant other: no  Recent change in frequency/specificity/intensity of suicidal thoughts or self-harm behavior? no  Current access to firearms, medications, or other identified means of suicide/self-harm? yes - knives  If yes, willing to restrict " "access to means of suicide/self-harm? yes - in treatment  Protective factors present:  Strong family connections and Willing to address in treatment    SAFETY ASSESSMENT - OTHERS  Does patient acknowledge current or past symptoms of aggressive behavior or risk to others? no  Does parent/significant other report patient has current or past symptoms of aggressive behavior or risk to others?  no  Does presenting problem suggest symptoms of dangerousness to others? No    Crisis Safety Plan completed and copy given to patient? pending    ABUSE/NEGLECT SCREENING  Does patient report feeling “unsafe” in his/her home, or afraid of anyone?  no  Does patient report any history of physical, sexual, or emotional abuse?  no  Does parent or significant other report any of the above? no  Is there evidence of neglect by self?  no  Is there evidence of neglect by a caregiver? no  Does the patient/parent report any history of CPS/APS/police involvement related to suspected abuse/neglect or domestic violence? no  Based on the information provided during the current assessment, is a mandated report of suspected abuse/neglect being made?  No    SUBSTANCE USE SCREENING  Yes:  Jermaine all substances used in the past 30 days: denies      Last Use Amount   []   Alcohol     []   Marijuana     []   Heroin     []   Prescription Opioids  (used without prescription, for    recreation, or in excess of prescribed amount)     []   Other Prescription  (used without prescription, for    recreation, or in excess of prescribed amount)     []   Cocaine      []   Methamphetamine     []   \"\" drugs (ectasy, MDMA)     []   Other substances        UDS results: pending  Breathalyzer results: 0.00    What consequences does the patient associate with any of the above substance use and or addictive behaviors? None    Risk factors for detox (check all that apply):  []  Seizures   []  Diaphoretic (sweating)   []  Tremors   []  Hallucinations   []  Increased " blood pressure   []  Decreased blood pressure   []  Other   []  None      [] Patient education on risk factors for detoxification and instructed to return to ER as needed.      MENTAL STATUS   Participation: Limited verbal participation, Defensive and Resistant  Grooming: Disheveled  Orientation: Drowsy/Somnolent  Behavior: Unusual behaviors noted and uncooperative.  Eye contact: Poor  Mood: Depressed and Angry  Affect: Flat and Angry  Thought process: unable to determine due to being uncooperative  Thought content: Paranoia  Speech: Soft, Latency of response and rambling slurring words.  Perception: unable to determine due to being uncooperative  Memory:  Recent:  Limited  Insight: Limited  Judgment:  Limited  Other:    Collateral information:   Source:  [] Significant other present in person:   [] Significant other by telephone  [] [facility]   [] [facility] Nursing Staff  [x] [facility]  [] Other:     [] Unable to complete full assessment due to:  [] Acute intoxication  [x] Patient declined to participate/engage  [] Patient verbally unresponsive  [] Significant cognitive deficits  [] Significant perceptual distortions or behavioral disorganization  [] Other:      CLINICAL IMPRESSIONS:  Primary:  Suicide attempt.   Secondary:  Schizophrenia       IDENTIFIED NEEDS/PLAN:  [Trigger DISPOSITION list for any items marked]    []  Imminent safety risk - self [] Imminent safety risk - others   []  Acute substance withdrawal []  Psychosis/Impaired reality testing   []  Mood/anxiety []  Substance use/Addictive behavior   []  Maladaptive behaviro []  Parent/child conflict   []  Family/Couples conflict []  Biomedical   []  Housing []  Financial   []   Legal  Occupational/Educational   []  Domestic violence []  Other:     Disposition: Refer to Legal [facility], [facility], [facility] and [facility]    Does patient express agreement with the above plan? no    Referral appointment(s)  scheduled? no    Alert team only:   I have discussed findings and recommendations with Dr. Otero who is in agreement with these recommendations.     Referral information sent to the following community providers :[facility], [facility] and [facility]  If applicable : Referred  to : Gay for legal hold follow up at (time): 7788      Faby Montenegro R.N.  10/11/2019

## 2019-10-11 NOTE — ED NOTES
Mother to bedside. Pt still asleep at this time. Mother updated on POC. Emergency contact info verified.

## 2019-10-11 NOTE — ED NOTES
Pt remains asleep. Somnolent but arousable. Respirations equal and unlabored. Pt unable to urinate at this time. Sitter 1:1. VSS.

## 2019-10-11 NOTE — ED PROVIDER NOTES
"ED Provider Note    11:26 AM    Subjective:  Patient is awaiting transfer to psychiatric facility.  He overdosed on Ambien.  He complains of right facial pain and tooth discomfort.  He states that this started today and he is having a hard time opening and closing his mouth.  He has not had a fever.  He is not sure if it swollen.  He denies trauma or fall.  He does report that he was on antibiotics and they seem to help before, but now swelling is returning.    Objective: BP (!) 90/60   Pulse 95   Temp 36.5 °C (97.7 °F) (Temporal)   Resp 20   Ht 1.753 m (5' 9\")   Wt 81.6 kg (180 lb)   SpO2 97%   BMI 26.58 kg/m² .  Calm and cooperative.  He is still somewhat sedated.  Generally nontoxic.  Mild right maxillary space swelling.  Intraoral examination demonstrates several dental caries with tenderness of the right maxillary gingiva with mild erythema of the gingiva overlying the right maxillary premolar and molars.  The tongue is normal.  There is no drainable abscess.    Laboratory data was reviewed.  Negative Tylenol and acetaminophen.    Assesment/Plan:   1.  Suicide attempt by overdose-plan for lifeskills evaluation and psychiatric care.  2.  Dental caries with associated infection-initiate amoxicillin 3 times daily no evidence of drainable abscess at this time.      Electronically signed by: Mason Dela Cruz, 10/11/2019 11:26 AM    "
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
## 2019-10-11 NOTE — ED NOTES
Patient's home medications have been reviewed by the pharmacy team.     Past Medical History:   Diagnosis Date   • Psychiatric disorder        Patient's Medications   New Prescriptions    AMOXICILLIN (AMOXIL) 500 MG CAP    Take 1 Cap by mouth 3 times a day for 7 days.   Previous Medications    SERTRALINE (ZOLOFT) 100 MG TAB    Take 200 mg by mouth every morning.    ZOLPIDEM (AMBIEN) 10 MG TAB    Take 10 mg by mouth at bedtime as needed for Sleep.   Modified Medications    No medications on file   Discontinued Medications    AMOXICILLIN 500 MG TAB    Take 1 Tab by mouth 3 times a day.    LURASIDONE (LATUDA) 80 MG TAB    Take 80 mg by mouth with dinner.    MIRTAZAPINE (REMERON) 15 MG TAB    Take 0.5 Tabs by mouth every bedtime.          A: Medications do not appear to be contributing to current complaints.     P: No recommendations at this time. Amoxicillin was continued per ERP for dental infection. Home medications have been discussed with ERP and sertraline and zolpidem was not been reordered at this time. Defer to psychiatry.     Serena Borden, Pharm.D., BCPS

## 2019-10-11 NOTE — ED NOTES
Pt waking up and removing monitors, continually requiring redirection. PSA at bedside. Pt still disoriented

## 2019-10-11 NOTE — ED NOTES
Assumed care of pt.  Pt attempted to get out of bed.  Pt with unsteady gait and slurred speech.  Pt assisted back into bed.

## 2019-10-11 NOTE — ED NOTES
"Pt's mother called originally started out concerned and inquiring about pt's status. When Legal hold protocol was discussed and explained and that we would have top wait for patient to become clear and coherent before the behavioral health/ life skills team could assess him she became very agitated. Pt's mother \"Jen\" [phone] began reporting her experience with EMS and how she had threatened to christianne them for not stepping into traffic to collect pt. This RN reiterated psych hold protocol. Pt's mother left number and reported she would F/U later.   "

## 2019-10-11 NOTE — ED NOTES
Med Rec completed per patient's home pharmacy ([facility])   Allergies reviewed  No ORAL antibiotics in last 14 days

## 2019-10-11 NOTE — ED PROVIDER NOTES
"ER Provider Note     Scribed for Hemal Aleman M.D. by Kenrick Lamas. 10/10/2019, 9:22 PM.    Primary Care Provider: Pcp Pt States None  Means of Arrival: EMS   History obtained from: Patient  History limited by: Acute Intoxication    CHIEF COMPLAINT  Chief Complaint   Patient presents with   • Drug Overdose   • Suicide Attempt       HPI  Olvin Watt is a 23 y.o. male who presents to the Emergency Department post status suspected drug overdose with ALOC. Per EMS, patient took approximately 30 Ambien in an attempt to take his life. [place] his friends, who called EMS after the patient became combative. Upon arrival to the ED, patient is very somnolent and endorsed SI to EMS.      HPI limited secondary to patient's acute intoxication.    REVIEW OF SYSTEMS  See HPI for further details.     ROS limited secondary to patient's acute intoxication.    PAST MEDICAL HISTORY   has a past medical history of Psychiatric disorder.    SURGICAL HISTORY  patient denies any surgical history    SOCIAL HISTORY  Social History     Tobacco Use   • Smoking status: Unknown If Ever Smoked   Substance Use Topics   • Alcohol use: Not on file   • Drug use: Yes      Social History     Substance and Sexual Activity   Drug Use Yes       FAMILY HISTORY  History reviewed. No pertinent family history.    CURRENT MEDICATIONS  Current Outpatient Medications:   •  Amoxicillin 500 MG Tab, Take 1 Tab by mouth 3 times a day., Disp: 21 Tab, Rfl: 0  •  mirtazapine (REMERON) 15 MG Tab, Take 0.5 Tabs by mouth every bedtime., Disp: 30 Tab, Rfl: 0  •  lurasidone (LATUDA) 80 MG Tab, Take 80 mg by mouth with dinner., Disp: , Rfl:   •  sertraline (ZOLOFT) 100 MG Tab, Take 100 mg by mouth every day., Disp: , Rfl:     ALLERGIES  No Known Allergies    PHYSICAL EXAM  VITAL SIGNS: BP (!) 91/57   Pulse 89   Temp 36.5 °C (97.7 °F) (Temporal)   Resp 20   Ht 1.753 m (5' 9\")   Wt 81.6 kg (180 lb)   SpO2 98%   " BMI 26.58 kg/m²       Constitutional: Somnolent, arousalkable to painful stimuli  HENT: No signs of trauma, Bilateral external ears normal, Nose normal.   Eyes: Pupils are equal and reactive 2-1, Conjunctiva normal, Non-icteric.   Neck: Normal range of motion, No tenderness, Supple, No stridor.   Lymphatic: No lymphadenopathy noted.   Cardiovascular: Regular rate and rhythm, no murmurs.   Thorax & Lungs: Normal breath sounds, No respiratory distress, No wheezing, No chest tenderness.   Abdomen: Bowel sounds normal, Soft, No tenderness, No masses, No pulsatile masses. No peritoneal signs.  Skin: Warm, Dry, No erythema, No rash.   Back: No bony tenderness, No CVA tenderness.   Extremities: Intact distal pulses, No edema, No tenderness, No cyanosis.  Musculoskeletal: Good range of motion in all major joints. No tenderness to palpation or major deformities noted.   Neurologic: Alert , Normal motor function, Normal sensory function, No focal deficits noted. No clonus or rigidity.  Psychiatric: Slurred speech    DIAGNOSTIC STUDIES / PROCEDURES    EKG Interpretation:  Interpreted by me  12 Lead EKG interpreted by me to show:  Cinus tachycardic  Rate 112  Axis: Normal  Intervals: Normal  Normal T waves  Normal ST segments  My impression of this EKG: Does not indicate ischemia or arrhythmia at this time.     LABS  Labs Reviewed   CBC WITH DIFFERENTIAL - Abnormal; Notable for the following components:       Result Value    MCHC 33.1 (*)     All other components within normal limits   BASIC METABOLIC PANEL - Abnormal; Notable for the following components:    Potassium 3.3 (*)     Glucose 128 (*)     Creatinine 1.44 (*)     All other components within normal limits   SALICYLATE - Abnormal; Notable for the following components:    Salicylates, Quant. 0 (*)     All other components within normal limits   DIAGNOSTIC ALCOHOL   ACETAMINOPHEN   ESTIMATED GFR   URINE DRUG SCREEN     All labs reviewed by me.    COURSE & MEDICAL  DECISION MAKING  Pertinent Labs & Imaging studies reviewed. (See chart for details)    This is a 23 y.o. male that presents with overdose on benzodiazepine versus other sleep aid.  This time the patient is controlling his airway.  He is arousable.  I will perform a toxicology work-up and look for salicylate versus Tylenol and get an alcohol level.  I will reassess after this..     9:22 PM - Patient seen and examined at bedside. Ordered acetaminophen, salicylate, CBC with diff, BMP, urine drug screen, diagnostic alcohol, and EKG.      10:50 PM - Patient's mother at bedside.     HYDRATION: Based on the patient's presentation of elevated creatinine the patient was given IV fluids. IV Hydration was used because oral hydration was not adequate alone. Upon recheck following hydration, the patient was I,proved.    On reassessment the patient has a negative salicylate as well as Tylenol level he has no electrolyte derangements.  He has a negative alcohol level.  I will have him evaluated by ED behavioral health when he awakes.  There is some significant about him being suicidal but it is difficult to assess at this time.    I send this patient out to Dr. Duarte follow-up with the behavioral health team.    FINAL IMPRESSION  1. Intentional drug overdose, initial encounter (HCC)          IKenrick (Scribe), am scribing for, and in the presence of, Hemal Aleman M.D..    Electronically signed by: Kenrick Lamas ([facility]), 10/10/2019    Hemal SEGOVIA M.D. personally performed the services described in this documentation, as scribed by Kenrick Lamas in my presence, and it is both accurate and complete.     C.    The note accurately reflects work and decisions made by me.  Hemal Aleman  10/11/2019  2:08 AM

## 2019-10-12 NOTE — ED NOTES
Pt transferred to [facility] with [facility].  Pt had no belongings, per report pt's Mom had taken them home.

## 2020-09-18 NOTE — DISCHARGE PLANNING
Attempted assessment. Uncooperative. Rambling and slurring words. Difficult to understand. Denies S/I or H/I, when asked why he took an overdose of Ambien, he reported the he only took 2 ambian and 2 pain pills. States he is just acting like he is loaded. When asked what the date is he responded that he needs some benzo's.  Will wait another hour to attempt assessment.  
Difficult to awaken, drowsy, states he wants to go home. Encouraged to eat and talk to this writer. Complains of tooth pain. Denies S/I.  Will complete assessment when he is done eating.  
JASON made aware that [facility] accepted patient.  Accepting MD [place].  JASON completed [facility] PCS form and obtained transport time of 16:30.  JASON updated [facility].   
Referral: Legal Hold     Intervention: Legal Hold Paperwork given to SW by Alert Team.     Legal Hold Initiated: Date: 10- Time: 0610     Patient’s Insurance Listed on Face Sheet: Medicare     [facility], [facility], and [facility].      This referral contains the following information:  1. Face sheet _x___  2. Page 1 and Page 2 of Legal Hold _x___  3. Alert Team Assessment/Psych Assessment _x__  4. Head to toe physical exam ___x_  5. Urine Drug Screen _x___  6. Blood Alcohol _x___  7. Vital signs __x__  8. Pregnancy test when applicable _NA__  9. Medications list __x__     Plan: Patient will transfer to mental health facility once acceptance is obtained  
(0) indicator not present
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
## 2022-06-07 NOTE — ED NOTES
Pt ambulated to the bathroom with security at bedside, pt voided 300ml dark genaro urine   Stable edema.  No intravitreal injection today.  I recommend patient returning in 2-3 wks for [facility] OS.

## 2023-02-16 NOTE — ED NOTES
Pt resting on cot. Eyes closed.  Pt breathing regular and not labored.  Security outside room.    +R periorbital ecchymosis and pain

## 2024-04-09 ENCOUNTER — APPOINTMENT (OUTPATIENT)
Dept: RADIOLOGY | Facility: MEDICAL CENTER | Age: 29
DRG: 084 | End: 2024-04-09
Attending: EMERGENCY MEDICINE
Payer: OTHER MISCELLANEOUS

## 2024-04-09 ENCOUNTER — HOSPITAL ENCOUNTER (INPATIENT)
Facility: MEDICAL CENTER | Age: 29
LOS: 2 days | DRG: 084 | End: 2024-04-11
Attending: EMERGENCY MEDICINE | Admitting: SURGERY
Payer: OTHER MISCELLANEOUS

## 2024-04-09 ENCOUNTER — APPOINTMENT (OUTPATIENT)
Dept: RADIOLOGY | Facility: MEDICAL CENTER | Age: 29
DRG: 084 | End: 2024-04-09
Payer: OTHER MISCELLANEOUS

## 2024-04-09 DIAGNOSIS — S06.5XAA SUBDURAL HEMATOMA (HCC): ICD-10-CM

## 2024-04-09 PROBLEM — T14.90XA TRAUMA: Status: ACTIVE | Noted: 2024-04-09

## 2024-04-09 PROBLEM — F99 PSYCHIATRIC DISORDER: Status: ACTIVE | Noted: 2024-04-09

## 2024-04-09 PROBLEM — Z53.09 CONTRAINDICATION TO DEEP VEIN THROMBOSIS (DVT) PROPHYLAXIS: Status: ACTIVE | Noted: 2024-04-09

## 2024-04-09 LAB
ABO GROUP BLD: NORMAL
ALBUMIN SERPL BCP-MCNC: 4.4 G/DL (ref 3.2–4.9)
ALBUMIN/GLOB SERPL: 1.8 G/DL
ALP SERPL-CCNC: 59 U/L (ref 30–99)
ALT SERPL-CCNC: 29 U/L (ref 2–50)
ANION GAP SERPL CALC-SCNC: 14 MMOL/L (ref 7–16)
APTT PPP: 22.7 SEC (ref 24.7–36)
AST SERPL-CCNC: 25 U/L (ref 12–45)
BILIRUB SERPL-MCNC: 0.5 MG/DL (ref 0.1–1.5)
BLD GP AB SCN SERPL QL: NORMAL
BUN SERPL-MCNC: 15 MG/DL (ref 8–22)
CALCIUM ALBUM COR SERPL-MCNC: 8.6 MG/DL (ref 8.5–10.5)
CALCIUM SERPL-MCNC: 8.9 MG/DL (ref 8.5–10.5)
CFT BLD TEG: 3.4 MIN (ref 4.6–9.1)
CFT P HPASE BLD TEG: 3.4 MIN (ref 4.3–8.3)
CHLORIDE SERPL-SCNC: 105 MMOL/L (ref 96–112)
CLOT ANGLE BLD TEG: 71 DEGREES (ref 63–78)
CLOT LYSIS 30M P MA LENFR BLD TEG: 0.3 % (ref 0–2.6)
CO2 SERPL-SCNC: 19 MMOL/L (ref 20–33)
CORTIS SERPL-MCNC: 34.2 UG/DL (ref 0–23)
CREAT SERPL-MCNC: 1.41 MG/DL (ref 0.5–1.4)
CT.EXTRINSIC BLD ROTEM: 1.5 MIN (ref 0.8–2.1)
ERYTHROCYTE [DISTWIDTH] IN BLOOD BY AUTOMATED COUNT: 41.4 FL (ref 35.9–50)
ETHANOL BLD-MCNC: <10.1 MG/DL
GFR SERPLBLD CREATININE-BSD FMLA CKD-EPI: 70 ML/MIN/1.73 M 2
GLOBULIN SER CALC-MCNC: 2.4 G/DL (ref 1.9–3.5)
GLUCOSE SERPL-MCNC: 131 MG/DL (ref 65–99)
HCT VFR BLD AUTO: 44.9 % (ref 42–52)
HGB BLD-MCNC: 15.8 G/DL (ref 14–18)
INR PPP: 1 (ref 0.87–1.13)
MCF BLD TEG: 54.5 MM (ref 52–69)
MCF.PLATELET INHIB BLD ROTEM: 15.5 MM (ref 15–32)
MCH RBC QN AUTO: 32 PG (ref 27–33)
MCHC RBC AUTO-ENTMCNC: 35.2 G/DL (ref 32.3–36.5)
MCV RBC AUTO: 91.1 FL (ref 81.4–97.8)
PA AA BLD-ACNC: 4.3 % (ref 0–11)
PA ADP BLD-ACNC: 74.5 % (ref 0–17)
PLATELET # BLD AUTO: 184 K/UL (ref 164–446)
PMV BLD AUTO: 10.8 FL (ref 9–12.9)
POTASSIUM SERPL-SCNC: 4 MMOL/L (ref 3.6–5.5)
PROT SERPL-MCNC: 6.8 G/DL (ref 6–8.2)
PROTHROMBIN TIME: 13.3 SEC (ref 12–14.6)
RBC # BLD AUTO: 4.93 M/UL (ref 4.7–6.1)
RH BLD: NORMAL
SODIUM SERPL-SCNC: 138 MMOL/L (ref 135–145)
TEG ALGORITHM TGALG: ABNORMAL
WBC # BLD AUTO: 17.3 K/UL (ref 4.8–10.8)

## 2024-04-09 PROCEDURE — 85027 COMPLETE CBC AUTOMATED: CPT

## 2024-04-09 PROCEDURE — 85576 BLOOD PLATELET AGGREGATION: CPT

## 2024-04-09 PROCEDURE — 700105 HCHG RX REV CODE 258: Performed by: NURSE PRACTITIONER

## 2024-04-09 PROCEDURE — A9270 NON-COVERED ITEM OR SERVICE: HCPCS | Performed by: NURSE PRACTITIONER

## 2024-04-09 PROCEDURE — 85730 THROMBOPLASTIN TIME PARTIAL: CPT

## 2024-04-09 PROCEDURE — 770022 HCHG ROOM/CARE - ICU (200)

## 2024-04-09 PROCEDURE — 72131 CT LUMBAR SPINE W/O DYE: CPT

## 2024-04-09 PROCEDURE — 85610 PROTHROMBIN TIME: CPT

## 2024-04-09 PROCEDURE — 700102 HCHG RX REV CODE 250 W/ 637 OVERRIDE(OP): Performed by: NURSE PRACTITIONER

## 2024-04-09 PROCEDURE — 700105 HCHG RX REV CODE 258: Performed by: SURGERY

## 2024-04-09 PROCEDURE — 80307 DRUG TEST PRSMV CHEM ANLYZR: CPT

## 2024-04-09 PROCEDURE — 85384 FIBRINOGEN ACTIVITY: CPT

## 2024-04-09 PROCEDURE — 81003 URINALYSIS AUTO W/O SCOPE: CPT

## 2024-04-09 PROCEDURE — 71260 CT THORAX DX C+: CPT

## 2024-04-09 PROCEDURE — 80053 COMPREHEN METABOLIC PANEL: CPT

## 2024-04-09 PROCEDURE — 86900 BLOOD TYPING SEROLOGIC ABO: CPT

## 2024-04-09 PROCEDURE — G0390 TRAUMA RESPONS W/HOSP CRITI: HCPCS

## 2024-04-09 PROCEDURE — 700111 HCHG RX REV CODE 636 W/ 250 OVERRIDE (IP): Mod: JZ | Performed by: NURSE PRACTITIONER

## 2024-04-09 PROCEDURE — 72170 X-RAY EXAM OF PELVIS: CPT

## 2024-04-09 PROCEDURE — 700117 HCHG RX CONTRAST REV CODE 255: Mod: UD | Performed by: EMERGENCY MEDICINE

## 2024-04-09 PROCEDURE — 86901 BLOOD TYPING SEROLOGIC RH(D): CPT

## 2024-04-09 PROCEDURE — 72128 CT CHEST SPINE W/O DYE: CPT

## 2024-04-09 PROCEDURE — 99222 1ST HOSP IP/OBS MODERATE 55: CPT | Performed by: SURGERY

## 2024-04-09 PROCEDURE — 99291 CRITICAL CARE FIRST HOUR: CPT

## 2024-04-09 PROCEDURE — 82533 TOTAL CORTISOL: CPT

## 2024-04-09 PROCEDURE — 70450 CT HEAD/BRAIN W/O DYE: CPT

## 2024-04-09 PROCEDURE — 82077 ASSAY SPEC XCP UR&BREATH IA: CPT

## 2024-04-09 PROCEDURE — 36415 COLL VENOUS BLD VENIPUNCTURE: CPT

## 2024-04-09 PROCEDURE — 85347 COAGULATION TIME ACTIVATED: CPT

## 2024-04-09 PROCEDURE — 71045 X-RAY EXAM CHEST 1 VIEW: CPT

## 2024-04-09 PROCEDURE — 86850 RBC ANTIBODY SCREEN: CPT

## 2024-04-09 RX ORDER — HYDROMORPHONE HYDROCHLORIDE 1 MG/ML
0.5 INJECTION, SOLUTION INTRAMUSCULAR; INTRAVENOUS; SUBCUTANEOUS
Status: DISCONTINUED | OUTPATIENT
Start: 2024-04-09 | End: 2024-04-11 | Stop reason: HOSPADM

## 2024-04-09 RX ORDER — SODIUM CHLORIDE 9 MG/ML
1000 INJECTION, SOLUTION INTRAVENOUS ONCE
Status: COMPLETED | OUTPATIENT
Start: 2024-04-09 | End: 2024-04-09

## 2024-04-09 RX ORDER — ENEMA 19; 7 G/133ML; G/133ML
1 ENEMA RECTAL
Status: DISCONTINUED | OUTPATIENT
Start: 2024-04-09 | End: 2024-04-11 | Stop reason: HOSPADM

## 2024-04-09 RX ORDER — ACETAMINOPHEN 325 MG/1
650 TABLET ORAL EVERY 4 HOURS PRN
Status: DISCONTINUED | OUTPATIENT
Start: 2024-04-09 | End: 2024-04-11 | Stop reason: HOSPADM

## 2024-04-09 RX ORDER — FAMOTIDINE 20 MG/1
20 TABLET, FILM COATED ORAL 2 TIMES DAILY
Status: DISCONTINUED | OUTPATIENT
Start: 2024-04-09 | End: 2024-04-11 | Stop reason: HOSPADM

## 2024-04-09 RX ORDER — OXYCODONE HYDROCHLORIDE 5 MG/1
5 TABLET ORAL
Status: DISCONTINUED | OUTPATIENT
Start: 2024-04-09 | End: 2024-04-11 | Stop reason: HOSPADM

## 2024-04-09 RX ORDER — ACETAMINOPHEN 650 MG/1
650 SUPPOSITORY RECTAL EVERY 4 HOURS PRN
Status: DISCONTINUED | OUTPATIENT
Start: 2024-04-09 | End: 2024-04-11 | Stop reason: HOSPADM

## 2024-04-09 RX ORDER — DOCUSATE SODIUM 100 MG/1
100 CAPSULE, LIQUID FILLED ORAL 2 TIMES DAILY
Status: DISCONTINUED | OUTPATIENT
Start: 2024-04-09 | End: 2024-04-11 | Stop reason: HOSPADM

## 2024-04-09 RX ORDER — BISACODYL 10 MG
10 SUPPOSITORY, RECTAL RECTAL
Status: DISCONTINUED | OUTPATIENT
Start: 2024-04-09 | End: 2024-04-11 | Stop reason: HOSPADM

## 2024-04-09 RX ORDER — ONDANSETRON 2 MG/ML
4 INJECTION INTRAMUSCULAR; INTRAVENOUS EVERY 4 HOURS PRN
Status: DISCONTINUED | OUTPATIENT
Start: 2024-04-09 | End: 2024-04-11 | Stop reason: HOSPADM

## 2024-04-09 RX ORDER — ONDANSETRON 4 MG/1
4 TABLET, ORALLY DISINTEGRATING ORAL EVERY 4 HOURS PRN
Status: DISCONTINUED | OUTPATIENT
Start: 2024-04-09 | End: 2024-04-11 | Stop reason: HOSPADM

## 2024-04-09 RX ORDER — LEVETIRACETAM 500 MG/5ML
1000 INJECTION, SOLUTION, CONCENTRATE INTRAVENOUS ONCE
Status: COMPLETED | OUTPATIENT
Start: 2024-04-09 | End: 2024-04-09

## 2024-04-09 RX ORDER — SODIUM CHLORIDE 9 MG/ML
INJECTION, SOLUTION INTRAVENOUS CONTINUOUS
Status: DISCONTINUED | OUTPATIENT
Start: 2024-04-09 | End: 2024-04-11 | Stop reason: HOSPADM

## 2024-04-09 RX ORDER — LEVETIRACETAM 500 MG/5ML
500 INJECTION, SOLUTION, CONCENTRATE INTRAVENOUS EVERY 12 HOURS
Status: DISCONTINUED | OUTPATIENT
Start: 2024-04-09 | End: 2024-04-11 | Stop reason: HOSPADM

## 2024-04-09 RX ORDER — OXYCODONE HYDROCHLORIDE 10 MG/1
10 TABLET ORAL
Status: DISCONTINUED | OUTPATIENT
Start: 2024-04-09 | End: 2024-04-11 | Stop reason: HOSPADM

## 2024-04-09 RX ORDER — LEVETIRACETAM 500 MG/1
500 TABLET ORAL EVERY 12 HOURS
Status: DISCONTINUED | OUTPATIENT
Start: 2024-04-09 | End: 2024-04-11 | Stop reason: HOSPADM

## 2024-04-09 RX ORDER — POLYETHYLENE GLYCOL 3350 17 G/17G
1 POWDER, FOR SOLUTION ORAL 2 TIMES DAILY
Status: DISCONTINUED | OUTPATIENT
Start: 2024-04-09 | End: 2024-04-11 | Stop reason: HOSPADM

## 2024-04-09 RX ORDER — AMOXICILLIN 250 MG
1 CAPSULE ORAL
Status: DISCONTINUED | OUTPATIENT
Start: 2024-04-09 | End: 2024-04-11 | Stop reason: HOSPADM

## 2024-04-09 RX ORDER — AMOXICILLIN 250 MG
1 CAPSULE ORAL NIGHTLY
Status: DISCONTINUED | OUTPATIENT
Start: 2024-04-09 | End: 2024-04-11 | Stop reason: HOSPADM

## 2024-04-09 RX ADMIN — SODIUM CHLORIDE: 9 INJECTION, SOLUTION INTRAVENOUS at 16:53

## 2024-04-09 RX ADMIN — LEVETIRACETAM 500 MG: 500 TABLET, FILM COATED ORAL at 17:09

## 2024-04-09 RX ADMIN — LEVETIRACETAM 1000 MG: 100 INJECTION, SOLUTION, CONCENTRATE INTRAVENOUS at 17:46

## 2024-04-09 RX ADMIN — OXYCODONE HYDROCHLORIDE 10 MG: 10 TABLET ORAL at 17:08

## 2024-04-09 RX ADMIN — IOHEXOL 94 ML: 350 INJECTION, SOLUTION INTRAVENOUS at 16:16

## 2024-04-09 RX ADMIN — ONDANSETRON 4 MG: 2 INJECTION INTRAMUSCULAR; INTRAVENOUS at 21:31

## 2024-04-09 RX ADMIN — ONDANSETRON 4 MG: 2 INJECTION INTRAMUSCULAR; INTRAVENOUS at 17:09

## 2024-04-09 RX ADMIN — FAMOTIDINE 20 MG: 10 INJECTION, SOLUTION INTRAVENOUS at 16:51

## 2024-04-09 RX ADMIN — HYDROMORPHONE HYDROCHLORIDE 0.5 MG: 1 INJECTION, SOLUTION INTRAMUSCULAR; INTRAVENOUS; SUBCUTANEOUS at 22:03

## 2024-04-09 RX ADMIN — SODIUM CHLORIDE 1000 ML: 9 INJECTION, SOLUTION INTRAVENOUS at 17:11

## 2024-04-09 ASSESSMENT — PAIN DESCRIPTION - PAIN TYPE
TYPE: ACUTE PAIN

## 2024-04-09 ASSESSMENT — COPD QUESTIONNAIRES
DURING THE PAST 4 WEEKS HOW MUCH DID YOU FEEL SHORT OF BREATH: NONE/LITTLE OF THE TIME
HAVE YOU SMOKED AT LEAST 100 CIGARETTES IN YOUR ENTIRE LIFE: YES
COPD SCREENING SCORE: 2
DO YOU EVER COUGH UP ANY MUCUS OR PHLEGM?: NO/ONLY WITH OCCASIONAL COLDS OR INFECTIONS
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
## 2024-04-09 NOTE — CARE PLAN
The patient is Watcher - Medium risk of patient condition declining or worsening      Problem: Pain - Standard  Goal: Alleviation of pain or a reduction in pain to the patient’s comfort goal  Outcome: Progressing
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
## 2024-04-09 NOTE — ASSESSMENT & PLAN NOTE
VTE prophylaxis initially contraindicated secondary to elevated bleeding risk.  4/11 Trauma surveillance venous duplex ultrasonography ordered.

## 2024-04-09 NOTE — ASSESSMENT & PLAN NOTE
Report of CT from sending facility showing scattered bifrontal subarachnoid hemorrhages. Bifrontal subdural hemorrhage.   CT on arrival showing tiny right frontal pterional subdural hematoma.  4/10 Interval CT head consistent with resolving subdural hematoma.  Non-operative management.  Post traumatic pharmacologic seizure prophylaxis for 1 week.  4/11 Speech Language Pathology cognitive evaluation completed. Recommending intermittent supervision upon discharge.  Ned Miller MD. Neurosurgeon. [facility].
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
## 2024-04-09 NOTE — PROGRESS NOTES
HR-101   BP- 105/56  SpO2- 98%  Temp- 97.7f  Weight- 88.5 kg      4 Eyes Skin Assessment Completed by SON Funez and SON Brown.    Head- contusion to occipital, boggy redness and abrasion to posterior head.   Ears WDL  Nose WDL  Mouth WDL  Neck WDL  Breast/Chest- Redness  Shoulder Blades- bruising and abrasion to shoulder.  Spine Redness and Blanching  (R) Arm/Elbow/Hand- Redness blanching  (L) Arm/Elbow/Hand WDL  Abdomen WDL  Groin WDL  Scrotum/Coccyx/Buttocks- Abrasions to lower back and coccyx.   (R) Leg- R knee abrasion.    (L) Leg WDL  (R) Heel/Foot/Toe- dry  (L) Heel/Foot/Toe - dry          Devices In Places Tele Box, Blood Pressure Cuff, Pulse Ox, and SCD's      Interventions In Place Sacral Mepilex, TAP System, Pillows, Q2 Turns, and Low Air Loss Mattress    Possible Skin Injury No    Pictures Uploaded Into Epic Yes  Wound Consult Placed N/A  RN Wound Prevention Protocol Ordered Yes      Belongings- None with the patient, belongings were left with mom per patient.

## 2024-04-10 ENCOUNTER — APPOINTMENT (OUTPATIENT)
Dept: RADIOLOGY | Facility: MEDICAL CENTER | Age: 29
DRG: 084 | End: 2024-04-10
Attending: CLINICAL NURSE SPECIALIST
Payer: OTHER MISCELLANEOUS

## 2024-04-10 LAB
ABO + RH BLD: NORMAL
ALBUMIN SERPL BCP-MCNC: 4 G/DL (ref 3.2–4.9)
ALBUMIN/GLOB SERPL: 1.8 G/DL
ALP SERPL-CCNC: 53 U/L (ref 30–99)
ALT SERPL-CCNC: 22 U/L (ref 2–50)
AMPHET UR QL SCN: NEGATIVE
ANION GAP SERPL CALC-SCNC: 12 MMOL/L (ref 7–16)
APPEARANCE UR: CLEAR
AST SERPL-CCNC: 24 U/L (ref 12–45)
BARBITURATES UR QL SCN: NEGATIVE
BASOPHILS # BLD AUTO: 0.2 % (ref 0–1.8)
BASOPHILS # BLD: 0.03 K/UL (ref 0–0.12)
BENZODIAZ UR QL SCN: NEGATIVE
BILIRUB SERPL-MCNC: 0.6 MG/DL (ref 0.1–1.5)
BILIRUB UR QL STRIP.AUTO: NEGATIVE
BUN SERPL-MCNC: 13 MG/DL (ref 8–22)
BZE UR QL SCN: NEGATIVE
CALCIUM ALBUM COR SERPL-MCNC: 8.5 MG/DL (ref 8.5–10.5)
CALCIUM SERPL-MCNC: 8.5 MG/DL (ref 8.5–10.5)
CANNABINOIDS UR QL SCN: POSITIVE
CHLORIDE SERPL-SCNC: 104 MMOL/L (ref 96–112)
CO2 SERPL-SCNC: 21 MMOL/L (ref 20–33)
COLOR UR: YELLOW
CORTIS SERPL-MCNC: 20.8 UG/DL (ref 0–23)
CREAT SERPL-MCNC: 1.18 MG/DL (ref 0.5–1.4)
EOSINOPHIL # BLD AUTO: 0.01 K/UL (ref 0–0.51)
EOSINOPHIL NFR BLD: 0.1 % (ref 0–6.9)
ERYTHROCYTE [DISTWIDTH] IN BLOOD BY AUTOMATED COUNT: 43.6 FL (ref 35.9–50)
FENTANYL UR QL: NEGATIVE
GFR SERPLBLD CREATININE-BSD FMLA CKD-EPI: 87 ML/MIN/1.73 M 2
GLOBULIN SER CALC-MCNC: 2.2 G/DL (ref 1.9–3.5)
GLUCOSE SERPL-MCNC: 112 MG/DL (ref 65–99)
GLUCOSE UR STRIP.AUTO-MCNC: NEGATIVE MG/DL
HCT VFR BLD AUTO: 45.2 % (ref 42–52)
HGB BLD-MCNC: 15.4 G/DL (ref 14–18)
IMM GRANULOCYTES # BLD AUTO: 0.04 K/UL (ref 0–0.11)
IMM GRANULOCYTES NFR BLD AUTO: 0.3 % (ref 0–0.9)
KETONES UR STRIP.AUTO-MCNC: NEGATIVE MG/DL
LEUKOCYTE ESTERASE UR QL STRIP.AUTO: NEGATIVE
LYMPHOCYTES # BLD AUTO: 1.2 K/UL (ref 1–4.8)
LYMPHOCYTES NFR BLD: 9.8 % (ref 22–41)
MCH RBC QN AUTO: 31.9 PG (ref 27–33)
MCHC RBC AUTO-ENTMCNC: 34.1 G/DL (ref 32.3–36.5)
MCV RBC AUTO: 93.6 FL (ref 81.4–97.8)
METHADONE UR QL SCN: NEGATIVE
MICRO URNS: ABNORMAL
MONOCYTES # BLD AUTO: 0.71 K/UL (ref 0–0.85)
MONOCYTES NFR BLD AUTO: 5.8 % (ref 0–13.4)
NEUTROPHILS # BLD AUTO: 10.28 K/UL (ref 1.82–7.42)
NEUTROPHILS NFR BLD: 83.8 % (ref 44–72)
NITRITE UR QL STRIP.AUTO: NEGATIVE
NRBC # BLD AUTO: 0 K/UL
NRBC BLD-RTO: 0 /100 WBC (ref 0–0.2)
OPIATES UR QL SCN: NEGATIVE
OXYCODONE UR QL SCN: NEGATIVE
PCP UR QL SCN: NEGATIVE
PH UR STRIP.AUTO: 8 [PH] (ref 5–8)
PLATELET # BLD AUTO: 199 K/UL (ref 164–446)
PMV BLD AUTO: 10.9 FL (ref 9–12.9)
POTASSIUM SERPL-SCNC: 4.1 MMOL/L (ref 3.6–5.5)
PROPOXYPH UR QL SCN: NEGATIVE
PROT SERPL-MCNC: 6.2 G/DL (ref 6–8.2)
PROT UR QL STRIP: NEGATIVE MG/DL
RBC # BLD AUTO: 4.83 M/UL (ref 4.7–6.1)
RBC UR QL AUTO: NEGATIVE
SODIUM SERPL-SCNC: 137 MMOL/L (ref 135–145)
SP GR UR STRIP.AUTO: >=1.045
UROBILINOGEN UR STRIP.AUTO-MCNC: 0.2 MG/DL
WBC # BLD AUTO: 12.3 K/UL (ref 4.8–10.8)

## 2024-04-10 PROCEDURE — 99233 SBSQ HOSP IP/OBS HIGH 50: CPT | Performed by: REGISTERED NURSE

## 2024-04-10 PROCEDURE — 700102 HCHG RX REV CODE 250 W/ 637 OVERRIDE(OP): Performed by: REGISTERED NURSE

## 2024-04-10 PROCEDURE — 80053 COMPREHEN METABOLIC PANEL: CPT

## 2024-04-10 PROCEDURE — 700111 HCHG RX REV CODE 636 W/ 250 OVERRIDE (IP): Mod: JZ | Performed by: NURSE PRACTITIONER

## 2024-04-10 PROCEDURE — 700111 HCHG RX REV CODE 636 W/ 250 OVERRIDE (IP): Mod: JZ | Performed by: SURGERY

## 2024-04-10 PROCEDURE — A9270 NON-COVERED ITEM OR SERVICE: HCPCS | Performed by: NURSE PRACTITIONER

## 2024-04-10 PROCEDURE — 700105 HCHG RX REV CODE 258: Performed by: REGISTERED NURSE

## 2024-04-10 PROCEDURE — 85025 COMPLETE CBC W/AUTO DIFF WBC: CPT

## 2024-04-10 PROCEDURE — 700105 HCHG RX REV CODE 258: Performed by: SURGERY

## 2024-04-10 PROCEDURE — 700105 HCHG RX REV CODE 258: Performed by: NURSE PRACTITIONER

## 2024-04-10 PROCEDURE — 770001 HCHG ROOM/CARE - MED/SURG/GYN PRIV*

## 2024-04-10 PROCEDURE — 700102 HCHG RX REV CODE 250 W/ 637 OVERRIDE(OP): Performed by: NURSE PRACTITIONER

## 2024-04-10 PROCEDURE — 70450 CT HEAD/BRAIN W/O DYE: CPT

## 2024-04-10 PROCEDURE — 82533 TOTAL CORTISOL: CPT

## 2024-04-10 PROCEDURE — 700101 HCHG RX REV CODE 250: Performed by: SURGERY

## 2024-04-10 PROCEDURE — A9270 NON-COVERED ITEM OR SERVICE: HCPCS | Performed by: REGISTERED NURSE

## 2024-04-10 RX ORDER — LORAZEPAM 2 MG/ML
1 INJECTION INTRAMUSCULAR
Status: DISCONTINUED | OUTPATIENT
Start: 2024-04-10 | End: 2024-04-10

## 2024-04-10 RX ORDER — SODIUM CHLORIDE, SODIUM LACTATE, POTASSIUM CHLORIDE, AND CALCIUM CHLORIDE .6; .31; .03; .02 G/100ML; G/100ML; G/100ML; G/100ML
1000 INJECTION, SOLUTION INTRAVENOUS ONCE
Status: COMPLETED | OUTPATIENT
Start: 2024-04-10 | End: 2024-04-10

## 2024-04-10 RX ORDER — FOLIC ACID 1 MG/1
1 TABLET ORAL DAILY
Status: DISCONTINUED | OUTPATIENT
Start: 2024-04-11 | End: 2024-04-10

## 2024-04-10 RX ORDER — LORAZEPAM 2 MG/ML
2 INJECTION INTRAMUSCULAR
Status: DISCONTINUED | OUTPATIENT
Start: 2024-04-10 | End: 2024-04-10

## 2024-04-10 RX ORDER — CHLORDIAZEPOXIDE HYDROCHLORIDE 25 MG/1
25 CAPSULE, GELATIN COATED ORAL EVERY 8 HOURS
Status: DISCONTINUED | OUTPATIENT
Start: 2024-04-10 | End: 2024-04-10

## 2024-04-10 RX ORDER — SCOLOPAMINE TRANSDERMAL SYSTEM 1 MG/1
1 PATCH, EXTENDED RELEASE TRANSDERMAL
Status: DISCONTINUED | OUTPATIENT
Start: 2024-04-10 | End: 2024-04-11 | Stop reason: HOSPADM

## 2024-04-10 RX ORDER — LORAZEPAM 2 MG/ML
3 INJECTION INTRAMUSCULAR
Status: DISCONTINUED | OUTPATIENT
Start: 2024-04-10 | End: 2024-04-10

## 2024-04-10 RX ORDER — LORAZEPAM 2 MG/ML
4 INJECTION INTRAMUSCULAR
Status: DISCONTINUED | OUTPATIENT
Start: 2024-04-10 | End: 2024-04-10

## 2024-04-10 RX ORDER — GAUZE BANDAGE 2" X 2"
100 BANDAGE TOPICAL DAILY
Status: DISCONTINUED | OUTPATIENT
Start: 2024-04-11 | End: 2024-04-10

## 2024-04-10 RX ADMIN — SODIUM CHLORIDE: 9 INJECTION, SOLUTION INTRAVENOUS at 04:37

## 2024-04-10 RX ADMIN — SODIUM CHLORIDE: 9 INJECTION, SOLUTION INTRAVENOUS at 23:41

## 2024-04-10 RX ADMIN — LEVETIRACETAM 500 MG: 500 TABLET, FILM COATED ORAL at 17:55

## 2024-04-10 RX ADMIN — SODIUM CHLORIDE, POTASSIUM CHLORIDE, SODIUM LACTATE AND CALCIUM CHLORIDE 1000 ML: 600; 310; 30; 20 INJECTION, SOLUTION INTRAVENOUS at 14:27

## 2024-04-10 RX ADMIN — MAGNESIUM SULFATE HEPTAHYDRATE: 500 INJECTION, SOLUTION INTRAMUSCULAR; INTRAVENOUS at 12:11

## 2024-04-10 RX ADMIN — FAMOTIDINE 20 MG: 20 TABLET, FILM COATED ORAL at 06:02

## 2024-04-10 RX ADMIN — FAMOTIDINE 20 MG: 20 TABLET, FILM COATED ORAL at 17:55

## 2024-04-10 RX ADMIN — SODIUM CHLORIDE: 9 INJECTION, SOLUTION INTRAVENOUS at 21:31

## 2024-04-10 RX ADMIN — ACETAMINOPHEN 650 MG: 325 TABLET, FILM COATED ORAL at 16:37

## 2024-04-10 RX ADMIN — ACETAMINOPHEN 650 MG: 325 TABLET, FILM COATED ORAL at 20:31

## 2024-04-10 RX ADMIN — LEVETIRACETAM 500 MG: 500 TABLET, FILM COATED ORAL at 06:02

## 2024-04-10 RX ADMIN — OXYCODONE 5 MG: 5 TABLET ORAL at 22:08

## 2024-04-10 RX ADMIN — OXYCODONE 5 MG: 5 TABLET ORAL at 04:36

## 2024-04-10 RX ADMIN — ONDANSETRON 4 MG: 2 INJECTION INTRAMUSCULAR; INTRAVENOUS at 06:36

## 2024-04-10 RX ADMIN — ONDANSETRON 4 MG: 2 INJECTION INTRAMUSCULAR; INTRAVENOUS at 20:31

## 2024-04-10 RX ADMIN — SCOPOLAMINE 1 PATCH: 1.5 PATCH, EXTENDED RELEASE TRANSDERMAL at 16:37

## 2024-04-10 ASSESSMENT — ENCOUNTER SYMPTOMS
DOUBLE VISION: 0
HALLUCINATIONS: 0
EYES NEGATIVE: 1
HEADACHES: 1
DIZZINESS: 0
SEIZURES: 0
WEAKNESS: 0
CARDIOVASCULAR NEGATIVE: 1
NAUSEA: 1
FOCAL WEAKNESS: 0
RESPIRATORY NEGATIVE: 1
VOMITING: 1
NERVOUS/ANXIOUS: 0
MUSCULOSKELETAL NEGATIVE: 1
DEPRESSION: 0
CONSTITUTIONAL NEGATIVE: 1
PSYCHIATRIC NEGATIVE: 1
BLURRED VISION: 0

## 2024-04-10 ASSESSMENT — FIBROSIS 4 INDEX: FIB4 SCORE: 0.68

## 2024-04-10 ASSESSMENT — PAIN DESCRIPTION - PAIN TYPE
TYPE: ACUTE PAIN

## 2024-04-10 NOTE — CARE PLAN
The patient is Stable - Low risk of patient condition declining or worsening    Shift Goals  Clinical Goals: Q1 hr stable neuro checks  Patient Goals: pain control  Family Goals: update from doctor    Progress made toward(s) clinical / shift goals:    Problem: Knowledge Deficit - Standard  Goal: Patient and family/care givers will demonstrate understanding of plan of care, disease process/condition, diagnostic tests and medications  Outcome: Progressing     Problem: Skin Integrity  Goal: Skin integrity is maintained or improved  Outcome: Progressing     Problem: Fall Risk  Goal: Patient will remain free from falls  Outcome: Progressing     Problem: Pain - Standard  Goal: Alleviation of pain or a reduction in pain to the patient’s comfort goal  Outcome: Progressing       Patient is not progressing towards the following goals:

## 2024-04-10 NOTE — PROGRESS NOTES
Med rec is complete per family at bedside.  Allergies reviewed.    Has patient had any outside antibiotics in the last 30 days? N    Any Anticoagulants (rivaroxaban, apixaban, edoxaban, dabigatran, warfarin, enoxaparin) taken in the last 14 days? N           Pharmacy/Pharmacies Pt utilizes : [facility]

## 2024-04-10 NOTE — PROGRESS NOTES
Neurosurgery Progress Note    Subjective:  C/o HA, n/v      Exam:    A&O x3, GCS 15  PERRL, EOMI  Face symm, tongue midline  SANCHEZ with FS, no drift      BP  Min: 72/39  Max: 118/57  Pulse  Av.2  Min: 57  Max: 87  Resp  Av.1  Min: 0  Max: 60  Temp  Av.4 °C (97.5 °F)  Min: 35.8 °C (96.4 °F)  Max: 36.8 °C (98.2 °F)  SpO2  Av.1 %  Min: 91 %  Max: 100 %    No data recorded    Recent Labs     24  1555 04/10/24  0425   WBC 17.3* 12.3*   RBC 4.93 4.83   HEMOGLOBIN 15.8 15.4   HEMATOCRIT 44.9 45.2   MCV 91.1 93.6   MCH 32.0 31.9   MCHC 35.2 34.1   RDW 41.4 43.6   PLATELETCT 184 199   MPV 10.8 10.9     Recent Labs     24  1555 04/10/24  0425   SODIUM 138 137   POTASSIUM 4.0 4.1   CHLORIDE 105 104   CO2 19* 21   GLUCOSE 131* 112*   BUN 15 13   CREATININE 1.41* 1.18   CALCIUM 8.9 8.5     Recent Labs     24  155   APTT 22.7*   INR 1.00     Recent Labs     24  155   REACTMIN 3.4*   CLOTKINET 1.5   CLOTANGL 71.0   MAXCLOTS 54.5   HKI76APS 0.3   PRCINADP 74.5*   PRCINAA 4.3       Intake/Output                         24 - 04/10/24 0659 04/10/24 0700 - 24 0659      Total  Total                 Intake    P.O.  --  100 100  --  -- --    P.O. -- 100 100 -- -- --    I.V.  0  2284.5 2284.5  --  -- --    Pre-Hospital Volume 0 -- 0 -- -- --    Trauma Resuscitation Volume 0 -- 0 -- -- --    Volume (mL) (NS infusion) -- 2284.5 2284.5 -- -- --    Blood  0  -- 0  --  -- --    PRBC Total Volume (Non-Barcoded) 0 -- 0 -- -- --    FFP Total Volume (Non-Barcoded) 0 -- 0 -- -- --    Platelets Total Volume (Non-Barcoded) 0 -- 0 -- -- --    Cryoprecipitate (Pooled) Total Volume (Non-Barcoded) 0 -- 0 -- -- --    Total Intake 0 2384.5 2384.5 -- -- --       Output    Urine  --  1000 1000  --  -- --    Urine Void (mL) -- 1000 1000 -- -- --    Emesis  --  350 350  --  -- --    Emesis -- 350 350 -- -- --    Other  0  -- 0  --  -- --    Pre-Hospital Output 0  -- 0 -- -- --    Trauma Resuscitation Output 0 -- 0 -- -- --    Stool  --  -- --  --  -- --    Number of Times Stooled -- 0 x 0 x -- -- --    Total Output 0 1350 1350 -- -- --       Net I/O     0 1034.5 1034.5 -- -- --              Intake/Output Summary (Last 24 hours) at 4/10/2024 0837  Last data filed at 4/10/2024 0600  Gross per 24 hour   Intake 2384.52 ml   Output 1350 ml   Net 1034.52 ml             Respiratory Therapy Consult   Continuous RT    Pharmacy Consult Request  1 Each PHARMACY TO DOSE    ondansetron  4 mg Q4HRS PRN    ondansetron  4 mg Q4HRS PRN    docusate sodium  100 mg BID    senna-docusate  1 Tablet Nightly    senna-docusate  1 Tablet Q24HRS PRN    polyethylene glycol/lytes  1 Packet BID    magnesium hydroxide  30 mL DAILY AT 1800    bisacodyl  10 mg Q24HRS PRN    sodium phosphate  1 Each Once PRN    NS   Continuous    oxyCODONE immediate-release  5 mg Q3HRS PRN    Or    oxyCODONE immediate-release  10 mg Q3HRS PRN    Or    HYDROmorphone  0.5 mg Q3HRS PRN    levETIRAcetam  500 mg Q12HRS    Or    levETIRAcetam (Keppra) IV  500 mg Q12HRS    famotidine  20 mg BID    Or    famotidine  20 mg BID    acetaminophen  650 mg Q4HRS PRN    Or    acetaminophen  650 mg Q4HRS PRN       Assessment and Plan:  Hospital day # 2 small right SDH vs TSAH   POD# n/a  Chemical prophylactic DVT therapy: No  Start date/time: ambulatory     Brain Compression: no    Neuro intact  N/v- antiemetics  HA- pain control  Repeat CT now, if stable then ok to discharge from our perspective and repeat CT in 2 weeks and f/u in our office  Keppra 500mg bid x 7 days   No ASA/NSAIDs    ATTENDING ADDENDUM:  agree with above note  Ct improved  No need for f/u

## 2024-04-10 NOTE — CARE PLAN
The patient is Watcher - Medium risk of patient condition declining or worsening    Shift Goals  Clinical Goals: q2 hour neuro checks, stable hemodynamics  Patient Goals: pain control  Family Goals: BRYCE    Progress made toward(s) clinical / shift goals:    Problem: Skin Integrity  Goal: Skin integrity is maintained or improved  Outcome: Progressing     Problem: Fall Risk  Goal: Patient will remain free from falls  Outcome: Progressing     Problem: Pain - Standard  Goal: Alleviation of pain or a reduction in pain to the patient’s comfort goal  Outcome: Progressing       Patient is not progressing towards the following goals:
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
## 2024-04-10 NOTE — ASSESSMENT & PLAN NOTE
Diagnosis of schizophrenia per chart review.  Takes no daily medication.  Recommend outpatient follow up with PCP and psychiatry.

## 2024-04-11 ENCOUNTER — PHARMACY VISIT (OUTPATIENT)
Dept: PHARMACY | Facility: MEDICAL CENTER | Age: 29
End: 2024-04-11
Payer: MEDICARE

## 2024-04-11 VITALS
TEMPERATURE: 97.9 F | HEIGHT: 68 IN | HEART RATE: 47 BPM | BODY MASS INDEX: 29.74 KG/M2 | RESPIRATION RATE: 18 BRPM | OXYGEN SATURATION: 93 % | SYSTOLIC BLOOD PRESSURE: 106 MMHG | WEIGHT: 196.21 LBS | DIASTOLIC BLOOD PRESSURE: 59 MMHG

## 2024-04-11 PROBLEM — Z53.09 CONTRAINDICATION TO DEEP VEIN THROMBOSIS (DVT) PROPHYLAXIS: Status: RESOLVED | Noted: 2024-04-09 | Resolved: 2024-04-11

## 2024-04-11 PROBLEM — T14.90XA TRAUMA: Status: RESOLVED | Noted: 2024-04-09 | Resolved: 2024-04-11

## 2024-04-11 LAB
ALBUMIN SERPL BCP-MCNC: 3.7 G/DL (ref 3.2–4.9)
ALBUMIN/GLOB SERPL: 1.5 G/DL
ALP SERPL-CCNC: 45 U/L (ref 30–99)
ALT SERPL-CCNC: 20 U/L (ref 2–50)
ANION GAP SERPL CALC-SCNC: 14 MMOL/L (ref 7–16)
AST SERPL-CCNC: 18 U/L (ref 12–45)
BASOPHILS # BLD AUTO: 0.3 % (ref 0–1.8)
BASOPHILS # BLD: 0.03 K/UL (ref 0–0.12)
BILIRUB SERPL-MCNC: 0.3 MG/DL (ref 0.1–1.5)
BUN SERPL-MCNC: 11 MG/DL (ref 8–22)
CALCIUM ALBUM COR SERPL-MCNC: 9.1 MG/DL (ref 8.5–10.5)
CALCIUM SERPL-MCNC: 8.9 MG/DL (ref 8.5–10.5)
CHLORIDE SERPL-SCNC: 107 MMOL/L (ref 96–112)
CO2 SERPL-SCNC: 19 MMOL/L (ref 20–33)
CREAT SERPL-MCNC: 1.16 MG/DL (ref 0.5–1.4)
EOSINOPHIL # BLD AUTO: 0.08 K/UL (ref 0–0.51)
EOSINOPHIL NFR BLD: 0.9 % (ref 0–6.9)
ERYTHROCYTE [DISTWIDTH] IN BLOOD BY AUTOMATED COUNT: 43.2 FL (ref 35.9–50)
GFR SERPLBLD CREATININE-BSD FMLA CKD-EPI: 88 ML/MIN/1.73 M 2
GLOBULIN SER CALC-MCNC: 2.5 G/DL (ref 1.9–3.5)
GLUCOSE SERPL-MCNC: 135 MG/DL (ref 65–99)
HCT VFR BLD AUTO: 42.7 % (ref 42–52)
HGB BLD-MCNC: 14.4 G/DL (ref 14–18)
IMM GRANULOCYTES # BLD AUTO: 0.04 K/UL (ref 0–0.11)
IMM GRANULOCYTES NFR BLD AUTO: 0.5 % (ref 0–0.9)
LYMPHOCYTES # BLD AUTO: 2.02 K/UL (ref 1–4.8)
LYMPHOCYTES NFR BLD: 23.1 % (ref 22–41)
MAGNESIUM SERPL-MCNC: 2 MG/DL (ref 1.5–2.5)
MCH RBC QN AUTO: 31.5 PG (ref 27–33)
MCHC RBC AUTO-ENTMCNC: 33.7 G/DL (ref 32.3–36.5)
MCV RBC AUTO: 93.4 FL (ref 81.4–97.8)
MONOCYTES # BLD AUTO: 0.6 K/UL (ref 0–0.85)
MONOCYTES NFR BLD AUTO: 6.9 % (ref 0–13.4)
NEUTROPHILS # BLD AUTO: 5.98 K/UL (ref 1.82–7.42)
NEUTROPHILS NFR BLD: 68.3 % (ref 44–72)
NRBC # BLD AUTO: 0 K/UL
NRBC BLD-RTO: 0 /100 WBC (ref 0–0.2)
PHOSPHATE SERPL-MCNC: 2.9 MG/DL (ref 2.5–4.5)
PLATELET # BLD AUTO: 184 K/UL (ref 164–446)
PMV BLD AUTO: 11.5 FL (ref 9–12.9)
POTASSIUM SERPL-SCNC: 3.9 MMOL/L (ref 3.6–5.5)
PROT SERPL-MCNC: 6.2 G/DL (ref 6–8.2)
RBC # BLD AUTO: 4.57 M/UL (ref 4.7–6.1)
SODIUM SERPL-SCNC: 140 MMOL/L (ref 135–145)
WBC # BLD AUTO: 8.8 K/UL (ref 4.8–10.8)

## 2024-04-11 PROCEDURE — 85025 COMPLETE CBC W/AUTO DIFF WBC: CPT

## 2024-04-11 PROCEDURE — 92523 SPEECH SOUND LANG COMPREHEN: CPT

## 2024-04-11 PROCEDURE — 83735 ASSAY OF MAGNESIUM: CPT

## 2024-04-11 PROCEDURE — 36415 COLL VENOUS BLD VENIPUNCTURE: CPT

## 2024-04-11 PROCEDURE — 84100 ASSAY OF PHOSPHORUS: CPT

## 2024-04-11 PROCEDURE — 99239 HOSP IP/OBS DSCHRG MGMT >30: CPT | Performed by: NURSE PRACTITIONER

## 2024-04-11 PROCEDURE — RXMED WILLOW AMBULATORY MEDICATION CHARGE: Performed by: NURSE PRACTITIONER

## 2024-04-11 PROCEDURE — A9270 NON-COVERED ITEM OR SERVICE: HCPCS | Performed by: NURSE PRACTITIONER

## 2024-04-11 PROCEDURE — 80053 COMPREHEN METABOLIC PANEL: CPT

## 2024-04-11 PROCEDURE — 700102 HCHG RX REV CODE 250 W/ 637 OVERRIDE(OP): Performed by: NURSE PRACTITIONER

## 2024-04-11 PROCEDURE — 700105 HCHG RX REV CODE 258: Performed by: NURSE PRACTITIONER

## 2024-04-11 PROCEDURE — 700111 HCHG RX REV CODE 636 W/ 250 OVERRIDE (IP): Mod: JZ | Performed by: NURSE PRACTITIONER

## 2024-04-11 RX ORDER — OXYCODONE HYDROCHLORIDE 5 MG/1
2.5-5 TABLET ORAL EVERY 6 HOURS PRN
Qty: 12 TABLET | Refills: 0 | Status: SHIPPED | OUTPATIENT
Start: 2024-04-11 | End: 2024-04-14

## 2024-04-11 RX ORDER — ACETAMINOPHEN 325 MG/1
650 TABLET ORAL EVERY 6 HOURS PRN
COMMUNITY
Start: 2024-04-11

## 2024-04-11 RX ORDER — LEVETIRACETAM 500 MG/1
500 TABLET ORAL EVERY 12 HOURS
Qty: 10 TABLET | Refills: 0 | Status: SHIPPED | OUTPATIENT
Start: 2024-04-11 | End: 2024-04-16

## 2024-04-11 RX ADMIN — ONDANSETRON 4 MG: 2 INJECTION INTRAMUSCULAR; INTRAVENOUS at 08:01

## 2024-04-11 RX ADMIN — ACETAMINOPHEN 650 MG: 325 TABLET, FILM COATED ORAL at 10:31

## 2024-04-11 RX ADMIN — POLYETHYLENE GLYCOL 3350 1 PACKET: 17 POWDER, FOR SOLUTION ORAL at 06:26

## 2024-04-11 RX ADMIN — FAMOTIDINE 20 MG: 20 TABLET, FILM COATED ORAL at 06:25

## 2024-04-11 RX ADMIN — LEVETIRACETAM 500 MG: 500 TABLET, FILM COATED ORAL at 06:25

## 2024-04-11 RX ADMIN — DOCUSATE SODIUM 100 MG: 100 CAPSULE, LIQUID FILLED ORAL at 06:25

## 2024-04-11 RX ADMIN — SODIUM CHLORIDE: 9 INJECTION, SOLUTION INTRAVENOUS at 08:02

## 2024-04-11 ASSESSMENT — FIBROSIS 4 INDEX: FIB4 SCORE: 0.69

## 2024-04-11 ASSESSMENT — PAIN DESCRIPTION - PAIN TYPE
TYPE: ACUTE PAIN
TYPE: ACUTE PAIN

## 2024-04-11 NOTE — DISCHARGE INSTRUCTIONS
- Call or seek medical attention for questions or concerns  - Follow up with Dr. Miller as needed, complete Keppra as prescribed, avoid all blood thinners including aspirin or NSAIDs (ibuprophen, Advil, Aleve, Motrin) for at least two weeks.  - Follow up with primary care provider within one weeks time  - Resume regular diet  - May take over the counter acetaminophen as needed for pain  - Continue daily over the counter stool softener while on narcotics  - No operation of machinery or motorized vehicles while under the influence of narcotics  - No alcohol, marijuana or illicit drug use while under the influence of narcotics  - In the event of a narcotic overdose naloxone (Narcan) is available without a prescription from any [facility] or [facility]  - No swimming, hot tubs, baths or wound submersion until cleared by outpatient provider. May shower  - No contact sports, strenuous activities, or heavy lifting until cleared by outpatient provider  - If respiratory decompensation, persistent or worsening pain, changes in mentation, or signs or symptoms of infection occur seek medical attention
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
## 2024-04-11 NOTE — CARE PLAN
The patient is Watcher - Medium risk of patient condition declining or worsening    Shift Goals  Clinical Goals: q2 hour neuro checks, stable hemodynamics  Patient Goals: pain control  Family Goals: BRYCE    Progress made toward(s) clinical / shift goals:    Problem: Knowledge Deficit - Standard  Goal: Patient and family/care givers will demonstrate understanding of plan of care, disease process/condition, diagnostic tests and medications  Outcome: Progressing     Problem: Skin Integrity  Goal: Skin integrity is maintained or improved  Outcome: Progressing     Problem: Fall Risk  Goal: Patient will remain free from falls  Outcome: Progressing     Problem: Pain - Standard  Goal: Alleviation of pain or a reduction in pain to the patient’s comfort goal  Outcome: Progressing     Problem: Optimal Care for Alcohol Withdrawal  Goal: Optimal Care for the alcohol withdrawal patient  Outcome: Progressing     Problem: Lifestyle Changes  Goal: Patient's ability to identify lifestyle changes and available resources to help reduce recurrence of condition will improve  Outcome: Progressing       Patient is not progressing towards the following goals:

## 2024-04-11 NOTE — CARE PLAN
The patient is Stable - Low risk of patient condition declining or worsening    Shift Goals  Clinical Goals: Stable neuro exams, safety  Patient Goals: Pain control, sleep  Family Goals: BRYCE    Progress made toward(s) clinical / shift goals:    Problem: Knowledge Deficit - Standard  Goal: Patient and family/care givers will demonstrate understanding of plan of care, disease process/condition, diagnostic tests and medications  Description: Target End Date:  1-3 days or as soon as patient condition allows    Document in Patient Education    1.  Patient and family/caregiver oriented to unit, equipment, visitation policy and means for communicating concern  2.  Complete/review Learning Assessment  3.  Assess knowledge level of disease process/condition, treatment plan, diagnostic tests and medications  4.  Explain disease process/condition, treatment plan, diagnostic tests and medications  Outcome: Progressing     Problem: Fall Risk  Goal: Patient will remain free from falls  Description: Target End Date:  Prior to discharge or change in level of care    Document interventions on the [facility] Fall Risk Assessment    1.  Assess for fall risk factors  2.  Implement fall precautions  Outcome: Progressing     Problem: Pain - Standard  Goal: Alleviation of pain or a reduction in pain to the patient’s comfort goal  Description: Target End Date:  Prior to discharge or change in level of care    Document on Vitals flowsheet    1.  Document pain using the appropriate pain scale per order or unit policy  2.  Educate and implement non-pharmacologic comfort measures (i.e. relaxation, distraction, massage, cold/heat therapy, etc.)  3.  Pain management medications as ordered  4.  Reassess pain after pain med administration per policy  5.  If opiods administered assess patient's response to pain medication is appropriate per POSS sedation scale  6.  Follow pain management plan developed in collaboration with patient and  interdisciplinary team (including palliative care or pain specialists if applicable)  Outcome: Progressing       Patient is not progressing towards the following goals:
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
## 2024-04-11 NOTE — PROGRESS NOTES
Discharge paperwork reviewed with patient and his brother (at bedside) and his mom (over the phone).   Waiting for mom to arrive with clothes.   Meds to beds are being prepared in pharmacy.   PIV removed.

## 2024-04-11 NOTE — PROGRESS NOTES
4 Eyes Skin Assessment Completed by SON Ferro and SON Adams.    Head Hematoma  Ears WDL  Nose WDL  Mouth WDL  Neck WDL  Breast/Chest WDL  Shoulder Blades WDL  Spine WDL  (R) Arm/Elbow/Hand Bruising and Scab  (L) Arm/Elbow/Hand Bruising, scar on 2nd finger nail  Abdomen WDL  Groin WDL  Scrotum/Coccyx/Buttocks WDL  (R) Leg Scab and Bruising  (L) Leg Scab, Bruising, and Abrasion  (R) Heel/Foot/Toe Scab  (L) Heel/Foot/Toe Scab          Devices In Places Pulse Ox and SCD's      Interventions In Place Pillows and Pressure Redistribution Mattress    Possible Skin Injury No    Pictures Uploaded Into Epic N/A  Wound Consult Placed N/A  RN Wound Prevention Protocol Ordered No

## 2025-08-11 ENCOUNTER — APPOINTMENT (OUTPATIENT)
Dept: RADIOLOGY | Facility: MEDICAL CENTER | Age: 30
End: 2025-08-11
Attending: STUDENT IN AN ORGANIZED HEALTH CARE EDUCATION/TRAINING PROGRAM

## 2025-08-11 ENCOUNTER — APPOINTMENT (OUTPATIENT)
Dept: RADIOLOGY | Facility: MEDICAL CENTER | Age: 30
End: 2025-08-11
Attending: RADIOLOGY

## 2025-08-11 ENCOUNTER — HOSPITAL ENCOUNTER (EMERGENCY)
Facility: MEDICAL CENTER | Age: 30
End: 2025-08-11
Attending: STUDENT IN AN ORGANIZED HEALTH CARE EDUCATION/TRAINING PROGRAM

## 2025-08-11 ENCOUNTER — APPOINTMENT (OUTPATIENT)
Dept: RADIOLOGY | Facility: MEDICAL CENTER | Age: 30
End: 2025-08-11
Attending: SURGERY

## 2025-08-11 VITALS
TEMPERATURE: 96.6 F | BODY MASS INDEX: 30.31 KG/M2 | RESPIRATION RATE: 18 BRPM | HEIGHT: 68 IN | OXYGEN SATURATION: 92 % | WEIGHT: 200 LBS | HEART RATE: 78 BPM | DIASTOLIC BLOOD PRESSURE: 67 MMHG | SYSTOLIC BLOOD PRESSURE: 128 MMHG
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
## 2025-08-11 PROBLEM — S71.132A GUNSHOT WOUND OF LEFT THIGH: Status: ACTIVE | Noted: 2025-08-11

## 2025-08-11 PROBLEM — T14.90XA TRAUMA: Status: ACTIVE | Noted: 2025-08-11

## 2025-08-11 LAB
ABO + RH BLD: NORMAL
ABO GROUP BLD: NORMAL
ALBUMIN SERPL BCP-MCNC: 5.1 G/DL (ref 3.2–4.9)
ALBUMIN/GLOB SERPL: 1.5 G/DL
ALP SERPL-CCNC: 78 U/L (ref 30–99)
ALT SERPL-CCNC: 39 U/L (ref 2–50)
ANION GAP SERPL CALC-SCNC: 17 MMOL/L (ref 7–16)
APTT PPP: 25.3 SEC (ref 24.7–36)
AST SERPL-CCNC: 28 U/L (ref 12–45)
BILIRUB SERPL-MCNC: 0.6 MG/DL (ref 0.1–1.5)
BLD GP AB SCN SERPL QL: NORMAL
BUN SERPL-MCNC: 16 MG/DL (ref 8–22)
CALCIUM ALBUM COR SERPL-MCNC: 9.4 MG/DL (ref 8.5–10.5)
CALCIUM SERPL-MCNC: 10.3 MG/DL (ref 8.5–10.5)
CFT BLD TEG: 5.4 MIN (ref 4.6–9.1)
CFT P HPASE BLD TEG: 5.4 MIN (ref 4.3–8.3)
CHLORIDE SERPL-SCNC: 106 MMOL/L (ref 96–112)
CLOT ANGLE BLD TEG: 63.8 DEGREES (ref 63–78)
CLOT LYSIS 30M P MA LENFR BLD TEG: 0 % (ref 0–2.6)
CO2 SERPL-SCNC: 22 MMOL/L (ref 20–33)
CREAT SERPL-MCNC: 1.68 MG/DL (ref 0.5–1.4)
CT.EXTRINSIC BLD ROTEM: 1.9 MIN (ref 0.8–2.1)
ERYTHROCYTE [DISTWIDTH] IN BLOOD BY AUTOMATED COUNT: 43.6 FL (ref 35.9–50)
ETHANOL BLD-MCNC: <10.1 MG/DL
GFR SERPLBLD CREATININE-BSD FMLA CKD-EPI: 56 ML/MIN/1.73 M 2
GLOBULIN SER CALC-MCNC: 3.3 G/DL (ref 1.9–3.5)
GLUCOSE SERPL-MCNC: 102 MG/DL (ref 65–99)
HCT VFR BLD AUTO: 50.9 % (ref 42–52)
HGB BLD-MCNC: 17.5 G/DL (ref 14–18)
INR PPP: 0.97 (ref 0.87–1.13)
MCF BLD TEG: 58.6 MM (ref 52–69)
MCF.PLATELET INHIB BLD ROTEM: 18.9 MM (ref 15–32)
MCH RBC QN AUTO: 31.7 PG (ref 27–33)
MCHC RBC AUTO-ENTMCNC: 34.4 G/DL (ref 32.3–36.5)
MCV RBC AUTO: 92.2 FL (ref 81.4–97.8)
PA AA BLD-ACNC: 24.7 % (ref 0–11)
PA ADP BLD-ACNC: 57 % (ref 0–17)
PLATELET # BLD AUTO: 313 K/UL (ref 164–446)
PMV BLD AUTO: 11 FL (ref 9–12.9)
POTASSIUM SERPL-SCNC: 3.3 MMOL/L (ref 3.6–5.5)
PROT SERPL-MCNC: 8.4 G/DL (ref 6–8.2)
PROTHROMBIN TIME: 12.9 SEC (ref 12–14.6)
RBC # BLD AUTO: 5.52 M/UL (ref 4.7–6.1)
RH BLD: NORMAL
SODIUM SERPL-SCNC: 145 MMOL/L (ref 135–145)
TEG ALGORITHM TGALG: ABNORMAL
WBC # BLD AUTO: 12.3 K/UL (ref 4.8–10.8)

## 2025-08-11 PROCEDURE — 85610 PROTHROMBIN TIME: CPT

## 2025-08-11 PROCEDURE — 71045 X-RAY EXAM CHEST 1 VIEW: CPT

## 2025-08-11 PROCEDURE — 99285 EMERGENCY DEPT VISIT HI MDM: CPT

## 2025-08-11 PROCEDURE — 700111 HCHG RX REV CODE 636 W/ 250 OVERRIDE (IP): Performed by: STUDENT IN AN ORGANIZED HEALTH CARE EDUCATION/TRAINING PROGRAM

## 2025-08-11 PROCEDURE — 80053 COMPREHEN METABOLIC PANEL: CPT

## 2025-08-11 PROCEDURE — 90471 IMMUNIZATION ADMIN: CPT

## 2025-08-11 PROCEDURE — 85027 COMPLETE CBC AUTOMATED: CPT

## 2025-08-11 PROCEDURE — 96374 THER/PROPH/DIAG INJ IV PUSH: CPT

## 2025-08-11 PROCEDURE — 700105 HCHG RX REV CODE 258: Performed by: SURGERY

## 2025-08-11 PROCEDURE — 700117 HCHG RX CONTRAST REV CODE 255: Performed by: STUDENT IN AN ORGANIZED HEALTH CARE EDUCATION/TRAINING PROGRAM

## 2025-08-11 PROCEDURE — 85730 THROMBOPLASTIN TIME PARTIAL: CPT

## 2025-08-11 PROCEDURE — 305949 HCHG RED TRAUMA ACT PRE-NOTIFY NO CC

## 2025-08-11 PROCEDURE — 85347 COAGULATION TIME ACTIVATED: CPT

## 2025-08-11 PROCEDURE — 86901 BLOOD TYPING SEROLOGIC RH(D): CPT | Mod: 91

## 2025-08-11 PROCEDURE — 73700 CT LOWER EXTREMITY W/O DYE: CPT | Mod: LT

## 2025-08-11 PROCEDURE — 72170 X-RAY EXAM OF PELVIS: CPT

## 2025-08-11 PROCEDURE — 85384 FIBRINOGEN ACTIVITY: CPT | Mod: 91

## 2025-08-11 PROCEDURE — 85576 BLOOD PLATELET AGGREGATION: CPT | Mod: 91

## 2025-08-11 PROCEDURE — 82077 ASSAY SPEC XCP UR&BREATH IA: CPT

## 2025-08-11 PROCEDURE — 86850 RBC ANTIBODY SCREEN: CPT

## 2025-08-11 PROCEDURE — 90715 TDAP VACCINE 7 YRS/> IM: CPT | Performed by: STUDENT IN AN ORGANIZED HEALTH CARE EDUCATION/TRAINING PROGRAM

## 2025-08-11 PROCEDURE — 74177 CT ABD & PELVIS W/CONTRAST: CPT

## 2025-08-11 PROCEDURE — 86900 BLOOD TYPING SEROLOGIC ABO: CPT

## 2025-08-11 PROCEDURE — 36415 COLL VENOUS BLD VENIPUNCTURE: CPT

## 2025-08-11 RX ORDER — CEFAZOLIN 2 G/1
2 INJECTION, POWDER, FOR SOLUTION INTRAMUSCULAR; INTRAVENOUS ONCE
Status: COMPLETED | OUTPATIENT
Start: 2025-08-11 | End: 2025-08-11

## 2025-08-11 RX ORDER — SODIUM CHLORIDE 9 MG/ML
INJECTION, SOLUTION INTRAVENOUS
Status: COMPLETED | OUTPATIENT
Start: 2025-08-11 | End: 2025-08-11

## 2025-08-11 RX ADMIN — CEFAZOLIN 2 G: 2 INJECTION, POWDER, FOR SOLUTION INTRAVENOUS at 20:47

## 2025-08-11 RX ADMIN — SODIUM CHLORIDE 1000 ML: 9 INJECTION, SOLUTION INTRAVENOUS at 18:59

## 2025-08-11 RX ADMIN — CLOSTRIDIUM TETANI TOXOID ANTIGEN (FORMALDEHYDE INACTIVATED), CORYNEBACTERIUM DIPHTHERIAE TOXOID ANTIGEN (FORMALDEHYDE INACTIVATED), BORDETELLA PERTUSSIS TOXOID ANTIGEN (GLUTARALDEHYDE INACTIVATED), BORDETELLA PERTUSSIS FILAMENTOUS HEMAGGLUTININ ANTIGEN (FORMALDEHYDE INACTIVATED), BORDETELLA PERTUSSIS PERTACTIN ANTIGEN, AND BORDETELLA PERTUSSIS FIMBRIAE 2/3 ANTIGEN 0.5 ML: 5; 2; 2.5; 5; 3; 5 INJECTION, SUSPENSION INTRAMUSCULAR at 19:45

## 2025-08-11 RX ADMIN — IOHEXOL 97 ML: 350 INJECTION, SOLUTION INTRAVENOUS at 19:30

## 2025-08-15 LAB — COMPONENT CELLULAR 8504CLL: NORMAL
